# Patient Record
Sex: FEMALE | Race: WHITE | NOT HISPANIC OR LATINO | Employment: OTHER | URBAN - METROPOLITAN AREA
[De-identification: names, ages, dates, MRNs, and addresses within clinical notes are randomized per-mention and may not be internally consistent; named-entity substitution may affect disease eponyms.]

---

## 2021-08-03 ENCOUNTER — APPOINTMENT (OUTPATIENT)
Dept: CT IMAGING | Facility: HOSPITAL | Age: 86
End: 2021-08-03

## 2021-08-03 ENCOUNTER — APPOINTMENT (OUTPATIENT)
Dept: GENERAL RADIOLOGY | Facility: HOSPITAL | Age: 86
End: 2021-08-03

## 2021-08-03 ENCOUNTER — HOSPITAL ENCOUNTER (INPATIENT)
Facility: HOSPITAL | Age: 86
LOS: 2 days | Discharge: HOSPICE | End: 2021-08-06
Attending: EMERGENCY MEDICINE | Admitting: STUDENT IN AN ORGANIZED HEALTH CARE EDUCATION/TRAINING PROGRAM

## 2021-08-03 DIAGNOSIS — I63.9 CEREBROVASCULAR ACCIDENT (CVA), UNSPECIFIED MECHANISM (HCC): Primary | ICD-10-CM

## 2021-08-03 PROBLEM — R41.82 ALTERED MENTAL STATUS: Status: ACTIVE | Noted: 2021-08-03

## 2021-08-03 LAB
ABO GROUP BLD: NORMAL
ALBUMIN SERPL-MCNC: 3.8 G/DL (ref 3.5–5.2)
ALBUMIN/GLOB SERPL: 1 G/DL
ALP SERPL-CCNC: 86 U/L (ref 39–117)
ALT SERPL W P-5'-P-CCNC: 12 U/L (ref 1–33)
ANION GAP SERPL CALCULATED.3IONS-SCNC: 11 MMOL/L (ref 5–15)
AST SERPL-CCNC: 24 U/L (ref 1–32)
BACTERIA UR QL AUTO: ABNORMAL /HPF
BASOPHILS # BLD AUTO: 0.1 10*3/MM3 (ref 0–0.2)
BASOPHILS NFR BLD AUTO: 0.8 % (ref 0–1.5)
BILIRUB SERPL-MCNC: 0.9 MG/DL (ref 0–1.2)
BILIRUB UR QL STRIP: NEGATIVE
BLD GP AB SCN SERPL QL: NEGATIVE
BUN SERPL-MCNC: 14 MG/DL (ref 8–23)
BUN/CREAT SERPL: 13 (ref 7–25)
CALCIUM SPEC-SCNC: 9.3 MG/DL (ref 8.2–9.6)
CHLORIDE SERPL-SCNC: 93 MMOL/L (ref 98–107)
CHOLEST SERPL-MCNC: 346 MG/DL (ref 0–200)
CK SERPL-CCNC: 391 U/L (ref 20–180)
CLARITY UR: CLEAR
CO2 SERPL-SCNC: 28 MMOL/L (ref 22–29)
COLOR UR: YELLOW
CREAT SERPL-MCNC: 1.08 MG/DL (ref 0.57–1)
DEPRECATED RDW RBC AUTO: 52.5 FL (ref 37–54)
EOSINOPHIL # BLD AUTO: 0 10*3/MM3 (ref 0–0.4)
EOSINOPHIL NFR BLD AUTO: 0.2 % (ref 0.3–6.2)
ERYTHROCYTE [DISTWIDTH] IN BLOOD BY AUTOMATED COUNT: 16.5 % (ref 12.3–15.4)
GFR SERPL CREATININE-BSD FRML MDRD: 48 ML/MIN/1.73
GLOBULIN UR ELPH-MCNC: 3.9 GM/DL
GLUCOSE BLDC GLUCOMTR-MCNC: 117 MG/DL (ref 70–105)
GLUCOSE SERPL-MCNC: 111 MG/DL (ref 65–99)
GLUCOSE UR STRIP-MCNC: NEGATIVE MG/DL
HCT VFR BLD AUTO: 46.7 % (ref 34–46.6)
HDLC SERPL-MCNC: 66 MG/DL (ref 40–60)
HGB BLD-MCNC: 15.8 G/DL (ref 12–15.9)
HGB UR QL STRIP.AUTO: ABNORMAL
HOLD SPECIMEN: NORMAL
HOLD SPECIMEN: NORMAL
HYALINE CASTS UR QL AUTO: ABNORMAL /LPF
INR PPP: 0.98 (ref 0.93–1.1)
KETONES UR QL STRIP: NEGATIVE
LDLC SERPL CALC-MCNC: 234 MG/DL (ref 0–100)
LDLC/HDLC SERPL: 3.55 {RATIO}
LEUKOCYTE ESTERASE UR QL STRIP.AUTO: NEGATIVE
LYMPHOCYTES # BLD AUTO: 0.9 10*3/MM3 (ref 0.7–3.1)
LYMPHOCYTES NFR BLD AUTO: 7 % (ref 19.6–45.3)
MCH RBC QN AUTO: 30.4 PG (ref 26.6–33)
MCHC RBC AUTO-ENTMCNC: 33.8 G/DL (ref 31.5–35.7)
MCV RBC AUTO: 89.9 FL (ref 79–97)
MONOCYTES # BLD AUTO: 0.8 10*3/MM3 (ref 0.1–0.9)
MONOCYTES NFR BLD AUTO: 5.8 % (ref 5–12)
NEUTROPHILS NFR BLD AUTO: 11.6 10*3/MM3 (ref 1.7–7)
NEUTROPHILS NFR BLD AUTO: 86.2 % (ref 42.7–76)
NITRITE UR QL STRIP: NEGATIVE
NRBC BLD AUTO-RTO: 0 /100 WBC (ref 0–0.2)
PH UR STRIP.AUTO: 7.5 [PH] (ref 5–8)
PLATELET # BLD AUTO: 335 10*3/MM3 (ref 140–450)
PMV BLD AUTO: 8.3 FL (ref 6–12)
POTASSIUM SERPL-SCNC: 3.7 MMOL/L (ref 3.5–5.2)
PROT SERPL-MCNC: 7.7 G/DL (ref 6–8.5)
PROT UR QL STRIP: ABNORMAL
PROTHROMBIN TIME: 10.9 SECONDS (ref 9.6–11.7)
RBC # BLD AUTO: 5.2 10*6/MM3 (ref 3.77–5.28)
RBC # UR: ABNORMAL /HPF
REF LAB TEST METHOD: ABNORMAL
RH BLD: POSITIVE
SARS-COV-2 RNA PNL SPEC NAA+PROBE: NOT DETECTED
SODIUM SERPL-SCNC: 132 MMOL/L (ref 136–145)
SP GR UR STRIP: 1.01 (ref 1–1.03)
SQUAMOUS #/AREA URNS HPF: ABNORMAL /HPF
T&S EXPIRATION DATE: NORMAL
TRIGL SERPL-MCNC: 228 MG/DL (ref 0–150)
TSH SERPL DL<=0.05 MIU/L-ACNC: 49.79 UIU/ML (ref 0.27–4.2)
UROBILINOGEN UR QL STRIP: ABNORMAL
VLDLC SERPL-MCNC: 46 MG/DL (ref 5–40)
WBC # BLD AUTO: 13.5 10*3/MM3 (ref 3.4–10.8)
WBC UR QL AUTO: ABNORMAL /HPF

## 2021-08-03 PROCEDURE — 86900 BLOOD TYPING SEROLOGIC ABO: CPT

## 2021-08-03 PROCEDURE — 86850 RBC ANTIBODY SCREEN: CPT | Performed by: EMERGENCY MEDICINE

## 2021-08-03 PROCEDURE — 82962 GLUCOSE BLOOD TEST: CPT

## 2021-08-03 PROCEDURE — G0378 HOSPITAL OBSERVATION PER HR: HCPCS

## 2021-08-03 PROCEDURE — 85610 PROTHROMBIN TIME: CPT | Performed by: EMERGENCY MEDICINE

## 2021-08-03 PROCEDURE — U0005 INFEC AGEN DETEC AMPLI PROBE: HCPCS | Performed by: EMERGENCY MEDICINE

## 2021-08-03 PROCEDURE — 93005 ELECTROCARDIOGRAM TRACING: CPT | Performed by: EMERGENCY MEDICINE

## 2021-08-03 PROCEDURE — 99285 EMERGENCY DEPT VISIT HI MDM: CPT

## 2021-08-03 PROCEDURE — 86901 BLOOD TYPING SEROLOGIC RH(D): CPT | Performed by: EMERGENCY MEDICINE

## 2021-08-03 PROCEDURE — 82550 ASSAY OF CK (CPK): CPT | Performed by: EMERGENCY MEDICINE

## 2021-08-03 PROCEDURE — 80053 COMPREHEN METABOLIC PANEL: CPT | Performed by: EMERGENCY MEDICINE

## 2021-08-03 PROCEDURE — 70450 CT HEAD/BRAIN W/O DYE: CPT

## 2021-08-03 PROCEDURE — 86901 BLOOD TYPING SEROLOGIC RH(D): CPT

## 2021-08-03 PROCEDURE — 36415 COLL VENOUS BLD VENIPUNCTURE: CPT | Performed by: EMERGENCY MEDICINE

## 2021-08-03 PROCEDURE — 84443 ASSAY THYROID STIM HORMONE: CPT | Performed by: NURSE PRACTITIONER

## 2021-08-03 PROCEDURE — 73523 X-RAY EXAM HIPS BI 5/> VIEWS: CPT

## 2021-08-03 PROCEDURE — 86900 BLOOD TYPING SEROLOGIC ABO: CPT | Performed by: EMERGENCY MEDICINE

## 2021-08-03 PROCEDURE — 85025 COMPLETE CBC W/AUTO DIFF WBC: CPT | Performed by: EMERGENCY MEDICINE

## 2021-08-03 PROCEDURE — 84481 FREE ASSAY (FT-3): CPT | Performed by: STUDENT IN AN ORGANIZED HEALTH CARE EDUCATION/TRAINING PROGRAM

## 2021-08-03 PROCEDURE — 83036 HEMOGLOBIN GLYCOSYLATED A1C: CPT | Performed by: NURSE PRACTITIONER

## 2021-08-03 PROCEDURE — 81001 URINALYSIS AUTO W/SCOPE: CPT | Performed by: EMERGENCY MEDICINE

## 2021-08-03 PROCEDURE — 71045 X-RAY EXAM CHEST 1 VIEW: CPT

## 2021-08-03 PROCEDURE — 99220 PR INITIAL OBSERVATION CARE/DAY 70 MINUTES: CPT | Performed by: NURSE PRACTITIONER

## 2021-08-03 PROCEDURE — 82607 VITAMIN B-12: CPT | Performed by: NURSE PRACTITIONER

## 2021-08-03 PROCEDURE — 80061 LIPID PANEL: CPT | Performed by: NURSE PRACTITIONER

## 2021-08-03 PROCEDURE — U0003 INFECTIOUS AGENT DETECTION BY NUCLEIC ACID (DNA OR RNA); SEVERE ACUTE RESPIRATORY SYNDROME CORONAVIRUS 2 (SARS-COV-2) (CORONAVIRUS DISEASE [COVID-19]), AMPLIFIED PROBE TECHNIQUE, MAKING USE OF HIGH THROUGHPUT TECHNOLOGIES AS DESCRIBED BY CMS-2020-01-R: HCPCS | Performed by: EMERGENCY MEDICINE

## 2021-08-03 PROCEDURE — 25010000002 DIPHENHYDRAMINE PER 50 MG: Performed by: NURSE PRACTITIONER

## 2021-08-03 PROCEDURE — P9612 CATHETERIZE FOR URINE SPEC: HCPCS

## 2021-08-03 PROCEDURE — 84439 ASSAY OF FREE THYROXINE: CPT | Performed by: STUDENT IN AN ORGANIZED HEALTH CARE EDUCATION/TRAINING PROGRAM

## 2021-08-03 RX ORDER — DONEPEZIL HYDROCHLORIDE 5 MG/1
10 TABLET, FILM COATED ORAL 2 TIMES DAILY
Status: DISCONTINUED | OUTPATIENT
Start: 2021-08-03 | End: 2021-08-06 | Stop reason: HOSPADM

## 2021-08-03 RX ORDER — SODIUM CHLORIDE 0.9 % (FLUSH) 0.9 %
10 SYRINGE (ML) INJECTION AS NEEDED
Status: DISCONTINUED | OUTPATIENT
Start: 2021-08-03 | End: 2021-08-06 | Stop reason: HOSPADM

## 2021-08-03 RX ORDER — BISACODYL 10 MG
10 SUPPOSITORY, RECTAL RECTAL DAILY PRN
Status: DISCONTINUED | OUTPATIENT
Start: 2021-08-03 | End: 2021-08-06 | Stop reason: HOSPADM

## 2021-08-03 RX ORDER — SODIUM CHLORIDE 0.9 % (FLUSH) 0.9 %
3 SYRINGE (ML) INJECTION EVERY 12 HOURS SCHEDULED
Status: DISCONTINUED | OUTPATIENT
Start: 2021-08-03 | End: 2021-08-06 | Stop reason: HOSPADM

## 2021-08-03 RX ORDER — ACETAMINOPHEN 650 MG/1
650 SUPPOSITORY RECTAL EVERY 4 HOURS PRN
Status: DISCONTINUED | OUTPATIENT
Start: 2021-08-03 | End: 2021-08-06 | Stop reason: HOSPADM

## 2021-08-03 RX ORDER — ASPIRIN 300 MG/1
300 SUPPOSITORY RECTAL ONCE
Status: COMPLETED | OUTPATIENT
Start: 2021-08-03 | End: 2021-08-03

## 2021-08-03 RX ORDER — PANTOPRAZOLE SODIUM 40 MG/1
40 TABLET, DELAYED RELEASE ORAL EVERY MORNING
Status: DISCONTINUED | OUTPATIENT
Start: 2021-08-04 | End: 2021-08-04

## 2021-08-03 RX ORDER — SODIUM CHLORIDE 9 MG/ML
75 INJECTION, SOLUTION INTRAVENOUS CONTINUOUS
Status: DISCONTINUED | OUTPATIENT
Start: 2021-08-03 | End: 2021-08-05

## 2021-08-03 RX ORDER — LEVOTHYROXINE SODIUM 0.12 MG/1
125 TABLET ORAL DAILY
Status: DISCONTINUED | OUTPATIENT
Start: 2021-08-03 | End: 2021-08-04

## 2021-08-03 RX ORDER — DIPHENHYDRAMINE HYDROCHLORIDE 50 MG/ML
25 INJECTION INTRAMUSCULAR; INTRAVENOUS ONCE
Status: COMPLETED | OUTPATIENT
Start: 2021-08-03 | End: 2021-08-03

## 2021-08-03 RX ORDER — ONDANSETRON 2 MG/ML
4 INJECTION INTRAMUSCULAR; INTRAVENOUS EVERY 6 HOURS PRN
Status: DISCONTINUED | OUTPATIENT
Start: 2021-08-03 | End: 2021-08-06 | Stop reason: HOSPADM

## 2021-08-03 RX ORDER — DONEPEZIL HYDROCHLORIDE 10 MG/1
10 TABLET, FILM COATED ORAL 2 TIMES DAILY
COMMUNITY

## 2021-08-03 RX ORDER — SODIUM CHLORIDE 0.9 % (FLUSH) 0.9 %
3-10 SYRINGE (ML) INJECTION AS NEEDED
Status: DISCONTINUED | OUTPATIENT
Start: 2021-08-03 | End: 2021-08-06 | Stop reason: HOSPADM

## 2021-08-03 RX ORDER — ASPIRIN 81 MG/1
81 TABLET, CHEWABLE ORAL DAILY
Status: DISCONTINUED | OUTPATIENT
Start: 2021-08-04 | End: 2021-08-04

## 2021-08-03 RX ORDER — LEVOTHYROXINE SODIUM 0.12 MG/1
125 TABLET ORAL DAILY
COMMUNITY

## 2021-08-03 RX ORDER — METOPROLOL SUCCINATE AND HYDROCHLOROTHIAZIDE 25; 12.5 MG/1; MG/1
1 TABLET ORAL 2 TIMES DAILY
COMMUNITY

## 2021-08-03 RX ORDER — ACETAMINOPHEN 500 MG
1000 TABLET ORAL EVERY 6 HOURS PRN
Status: DISCONTINUED | OUTPATIENT
Start: 2021-08-03 | End: 2021-08-06 | Stop reason: HOSPADM

## 2021-08-03 RX ORDER — BISOPROLOL FUMARATE AND HYDROCHLOROTHIAZIDE 10; 6.25 MG/1; MG/1
1 TABLET ORAL
Status: DISCONTINUED | OUTPATIENT
Start: 2021-08-03 | End: 2021-08-03

## 2021-08-03 RX ORDER — ACETAMINOPHEN 325 MG/1
650 TABLET ORAL EVERY 4 HOURS PRN
Status: DISCONTINUED | OUTPATIENT
Start: 2021-08-03 | End: 2021-08-06 | Stop reason: HOSPADM

## 2021-08-03 RX ORDER — ACETAMINOPHEN 500 MG
1000 TABLET ORAL EVERY 6 HOURS PRN
COMMUNITY

## 2021-08-03 RX ORDER — OMEPRAZOLE 40 MG/1
40 CAPSULE, DELAYED RELEASE ORAL DAILY
COMMUNITY

## 2021-08-03 RX ADMIN — LEVOTHYROXINE SODIUM 125 MCG: 0.12 TABLET ORAL at 22:32

## 2021-08-03 RX ADMIN — DIPHENHYDRAMINE HYDROCHLORIDE 25 MG: 50 INJECTION INTRAMUSCULAR; INTRAVENOUS at 22:32

## 2021-08-03 RX ADMIN — Medication 3 ML: at 22:32

## 2021-08-03 RX ADMIN — SODIUM CHLORIDE 75 ML/HR: 9 INJECTION, SOLUTION INTRAVENOUS at 23:16

## 2021-08-03 RX ADMIN — ASPIRIN 300 MG: 300 SUPPOSITORY RECTAL at 16:20

## 2021-08-03 RX ADMIN — METOPROLOL TARTRATE 25 MG: 25 TABLET, FILM COATED ORAL at 22:32

## 2021-08-03 RX ADMIN — DONEPEZIL HYDROCHLORIDE 10 MG: 5 TABLET, FILM COATED ORAL at 22:32

## 2021-08-03 NOTE — CASE MANAGEMENT/SOCIAL WORK
Discharge Planning Assessment  [facility]     Patient Name: Cindy Davis  MRN: [id number]  Today's Date: 8/3/2021    Admit Date: 8/3/2021    Discharge Needs Assessment    No documentation.       Discharge Plan     Row Name 08/03/21 1806       Plan    Plan  DC Plan: Seeking ECF with [facility].    Plan Comments  Spoke with patient, daughter in law and friend. Bigg miguel lives in own home with support of family and [facility]. She has a rx. walker, PCP: Jhonny, [facility]acy: [facility] Now seeking placment and would like to resume [facility] at facility. Notifed Hospice of admission.        Continued Care and Services - Admitted Since 8/3/2021     Destination     Service Provider Request Status Selected Services Address Phone Fax Patient Preferred    [facility]  Pending - Request Sent N/A [address] [place] [address] [phone] --                Demographic Summary     Row Name 08/03/21 1805       General Information    Admission Type  observation    Arrived From  emergency department    Referral Source  emergency department    Reason for Consult  discharge planning    Preferred Language  English        Functional Status     Row Name 08/03/21 1805       Functional Status    Usual Activity Tolerance  poor    Current Activity Tolerance  poor       Functional Status, IADL    Medications  completely dependent    Meal Preparation  completely dependent    Housekeeping  completely dependent        Elen Cabrera RN   Met with patient in room wearing PPE: mask, face shield/goggles, gloves, gown.      Maintained distance greater than six feet and spent less than 15 minutes in the room.    ;p

## 2021-08-03 NOTE — PLAN OF CARE
Problem: Adult Inpatient Plan of Care  Goal: Plan of Care Review  Outcome: Ongoing, Progressing  Flowsheets (Taken 8/3/2021 1847)  Plan of Care Reviewed With: patient  Goal: Patient-Specific Goal (Individualized)  Outcome: Ongoing, Progressing  Goal: Absence of Hospital-Acquired Illness or Injury  Outcome: Ongoing, Progressing  Intervention: Identify and Manage Fall Risk  Recent Flowsheet Documentation  Taken 8/3/2021 1836 by Dahlia Guzman RN  Safety Promotion/Fall Prevention:   activity supervised   clutter free environment maintained   assistive device/personal items within reach   fall prevention program maintained   lighting adjusted   nonskid shoes/slippers when out of bed   room organization consistent   safety round/check completed  Intervention: Prevent Skin Injury  Recent Flowsheet Documentation  Taken 8/3/2021 1836 by Dahlia Guzman RN  Body Position:   turned   supine  Skin Protection:   adhesive use limited   incontinence pads utilized   transparent dressing maintained   tubing/devices free from skin contact  Intervention: Prevent Infection  Recent Flowsheet Documentation  Taken 8/3/2021 1836 by Dahlia Guzman RN  Infection Prevention:   personal protective equipment utilized   hand hygiene promoted  Goal: Optimal Comfort and Wellbeing  Outcome: Ongoing, Progressing  Intervention: Provide Person-Centered Care  Recent Flowsheet Documentation  Taken 8/3/2021 1836 by Dahlia Guzman RN  Trust Relationship/Rapport:   care explained   choices provided   emotional support provided   empathic listening provided   questions encouraged   questions answered   thoughts/feelings acknowledged  Goal: Readiness for Transition of Care  Outcome: Ongoing, Progressing  Intervention: Mutually Develop Transition Plan  Recent Flowsheet Documentation  Taken 8/3/2021 1835 by Dahlia Guzman RN  Transportation Anticipated: family or friend will provide  Patient/Family Anticipated Services at Transition: case  manager  Patient/Family Anticipates Transition to: inpatient rehabilitation facility     Problem: Fall Injury Risk  Goal: Absence of Fall and Fall-Related Injury  Outcome: Ongoing, Progressing  Intervention: Identify and Manage Contributors to Fall Injury Risk  Recent Flowsheet Documentation  Taken 8/3/2021 1836 by Dahlia Guzman, RN  Medication Review/Management:   medications reviewed   high-risk medications identified  Self-Care Promotion: independence encouraged  Intervention: Promote Injury-Free Environment  Recent Flowsheet Documentation  Taken 8/3/2021 1836 by Dahlia Guzman RN  Safety Promotion/Fall Prevention:   activity supervised   clutter free environment maintained   assistive device/personal items within reach   fall prevention program maintained   lighting adjusted   nonskid shoes/slippers when out of bed   room organization consistent   safety round/check completed     Problem: Skin Injury Risk Increased  Goal: Skin Health and Integrity  Outcome: Ongoing, Progressing  Intervention: Optimize Skin Protection  Recent Flowsheet Documentation  Taken 8/3/2021 1836 by Dahlia Guzman, RN  Head of Bed (HOB): HOB elevated  Skin Protection:   adhesive use limited   incontinence pads utilized   transparent dressing maintained   tubing/devices free from skin contact   Goal Outcome Evaluation:  Plan of Care Reviewed With: patient

## 2021-08-03 NOTE — H&P
"    [facility] Medicine Services      Patient Name: Cindy Davis  : 1929  MRN: [id number]  Primary Care Physician:  Provider, No Known  Date of admission: 8/3/2021      Subjective      Chief Complaint: found down at home    History of Present Illness: Cindy Davis is a 89 y.o. female with PMH of CVA, HTN, HLD, previous CVA, and dementia who is at patient of [facility] being treated 3 days a week. She has advanced dementia and has been falling frequently. She will have days where she is lucid and can fully care for herself and then other days where she becomes confused. She was found down at home by the daughter and appeared very confused this morning on 8/3/2021. She was last seen in her normal state of health around dinnertime last night. Hospice came to evaluate the patient and felt the patient needed to go to the ER for evaluation. The family and hospice have been working on getting the patient placed into a facility with 24 hour care. She is a DNR/DNI but not completely comfort care and the family would like further stroke workup.  They denied any recent illness of the patient such as fever, nausea, vomiting.     In the ED the patient is disoriented but will make random statements such as \"I can't get my arm throughout there.\" She cannot answer any questions or follow commands. She moves bilateral upper extremities spontaneously.  CT head showed no acute findings, limited study due to motion artifact, chronic ischemic changes.  CXR showed no acute cardiopulmonary process.  X-ray of bilateral hips showed no acute fracture or traumatic malalignment.  CK total was 391, creatinine 1.08, WBC 13.5.  Urinalysis showed no signs of infection.  Covid not detected. She was given 300mg rectal aspirin. She was admitted for further stroke workup and placement. Discussed case with case management and they will contact the patient's hospice company.       Review of Systems   Unable " to perform ROS: patient nonverbal       Personal History     Past Medical History:   Diagnosis Date   • Alzheimer's dementia (CMS/HCC)    • Breast CA (CMS/HCC)    • HTN (hypertension)    • Hyperlipidemia    • Hypothyroidism        History reviewed. No pertinent surgical history.    Family History: family history is not on file. Otherwise pertinent FHx was reviewed and not pertinent to current issue.    Social History:  reports that she has never smoked. She does not have any smokeless tobacco history on file.    Home Medications:  Prior to Admission Medications     Prescriptions Last Dose Informant Patient Reported? Taking?    donepezil (ARICEPT) 10 MG tablet   Yes Yes    Take 10 mg by mouth Every Night.    Levothyroxine Sodium (LEVOTHROID PO)   Yes Yes    Take  by mouth.            Allergies:  No Known Allergies    Objective      Vitals:   Temp:  [97.6 °F (36.4 °C)-97.8 °F (36.6 °C)] 97.6 °F (36.4 °C)  Heart Rate:  [60-74] 60  Resp:  [16-18] 18  BP: (140-183)/(54-99) 140/54    Physical Exam  Vitals and nursing note reviewed.   HENT:      Head: Normocephalic and atraumatic.   Eyes:      Pupils: Pupils are equal, round, and reactive to light.   Cardiovascular:      Rate and Rhythm: Normal rate and regular rhythm.      Pulses: Normal pulses.      Heart sounds: Normal heart sounds.   Pulmonary:      Effort: Pulmonary effort is normal.      Breath sounds: Normal breath sounds.   Abdominal:      General: Bowel sounds are normal.      Palpations: Abdomen is soft.      Tenderness: There is no abdominal tenderness.   Skin:     General: Skin is warm and dry.   Neurological:      Mental Status: She is confused.      Comments: Makes random statements, cannot follow commands or answer questions. Moves bilateral upper extremities    Psychiatric:         Behavior: Behavior is agitated.         Cognition and Memory: Cognition is impaired.         Result Review    Result Review:  I have personally reviewed the results from the time  "of this admission to 8/3/2021 18:56 EDT and agree with these findings:  [x]  Laboratory  []  Microbiology  [x]  Radiology  []  EKG/Telemetry   []  Cardiology/Vascular   []  Pathology  [x]  Old records  []  Other:      Assessment/Plan        Active Hospital Problems:  Active Hospital Problems    Diagnosis    • **Altered mental status    • Fall    • Alzheimer's dementia (CMS/HCC)    • HTN (hypertension)    • Hypothyroidism    • History of CVA (cerebrovascular accident)      Plan:     Altered mental status r/o CVA  Fall   -pt found down and confused morning of 8/3/21 and now remains confused and poorly communicative, no obvious lateralizing deficits  -CT head: no acute findings, limited study due to motion artifact, chronic ischemic changes.   -WBC elevated 13.5  -UA without signs of infection  -CK total 391, low rate IVFs   -given 300mg rectal aspirin in ED, will not give statin due to elevated CK level  -Family wants stroke workup with MRI brain, 2D echo, TSH/lipid panel/hgb a1c/vit b12, PT/OT/ST  -neurology consulted for further recommendations  -DNR/DNI, hospice patient and will consult. Pt will need placement to facility, case management aware.   -pt agitated and family requesting medication to \"calm her down.\" Will try benadryl and if no improvement, will add prn ativan     Dementia  -cont home aricept    Hypothyroidism  -cont home synthroid    H/o CVA    Hypertension  -cont home bb      DVT prophylaxis:  Mechanical DVT prophylaxis orders are present.    CODE STATUS:    Code Status: No CPR  Medical Interventions (Level of Support Prior to Arrest): Comfort Measures    Admission Status:  I believe this patient meets observation status.    I discussed the patient's findings and my recommendations with family.    This patient has been examined wearing appropriate Personal Protective Equipment 08/03/21      Signature: Electronically signed by FELICIANO Paul, 08/03/21, 6:58 PM EDT.  "

## 2021-08-03 NOTE — ED PROVIDER NOTES
Subjective   History of Present Illness  89-year-old female presents after being found on floor today.  Her last known normal was last night around dinnertime.  She does not offer meaningful complaints at this time.  She was noted to be confused.  Family reports that she normally can talk coherently.  She does have dementia that has been getting worse though.  She has had more falls recently.  Review of Systems  Unable to obtain due to current condition  Past Medical History:   Diagnosis Date   • Alzheimer's dementia (CMS/[facility])    • Breast CA (CMS/[facility])    • HTN (hypertension)    • Hyperlipidemia    • Hyperthyroidism        No Known Allergies    History reviewed. No pertinent surgical history.    History reviewed. No pertinent family history.    Social History     Socioeconomic History   • Marital status:      Spouse name: Not on file   • Number of children: Not on file   • Years of education: Not on file   • Highest education level: Not on file     Prior to Admission medications    Medication Sig Start Date End Date Taking? Authorizing Provider   donepezil (ARICEPT) 10 MG tablet Take 10 mg by mouth Every Night.   Yes ProviderBob MD   Levothyroxine Sodium (LEVOTHROID PO) Take  by mouth.   Yes Provider, MD Bob           Objective   Physical Exam  89-year-old female awake alert.  She is noted to have confused speech.  Pupils equal round at light.  Examination of head reveals no obvious injury.  Chest clear equal breath sounds.  Cardiovascular regular rhythm abdomen soft nontender.  She does complain of some pain with movement of both hips.  Neurologic exam she is confused.  She is able to raise both arms.  She blinks with confrontation bilaterally.  NIH stroke scale 11  Procedures           ED Course      Results for orders placed or performed during the hospital encounter of 08/03/21   Comprehensive Metabolic Panel    Specimen: Blood   Result Value Ref Range    Glucose 111 (H) 65 - 99 mg/dL     BUN 14 8 - 23 mg/dL    Creatinine 1.08 (H) 0.57 - 1.00 mg/dL    Sodium 132 (L) 136 - 145 mmol/L    Potassium 3.7 3.5 - 5.2 mmol/L    Chloride 93 (L) 98 - 107 mmol/L    CO2 28.0 22.0 - 29.0 mmol/L    Calcium 9.3 8.2 - 9.6 mg/dL    Total Protein 7.7 6.0 - 8.5 g/dL    Albumin 3.80 3.50 - 5.20 g/dL    ALT (SGPT) 12 1 - 33 U/L    AST (SGOT) 24 1 - 32 U/L    Alkaline Phosphatase 86 39 - 117 U/L    Total Bilirubin 0.9 0.0 - 1.2 mg/dL    eGFR Non African Amer 48 (L) >60 mL/min/1.73    Globulin 3.9 gm/dL    A/G Ratio 1.0 g/dL    BUN/Creatinine Ratio 13.0 7.0 - 25.0    Anion Gap 11.0 5.0 - 15.0 mmol/L   Protime-INR    Specimen: Blood   Result Value Ref Range    Protime 10.9 9.6 - 11.7 Seconds    INR 0.98 0.93 - 1.10   CBC Auto Differential    Specimen: Blood   Result Value Ref Range    WBC 13.50 (H) 3.40 - 10.80 10*3/mm3    RBC 5.20 3.77 - 5.28 10*6/mm3    Hemoglobin 15.8 12.0 - 15.9 g/dL    Hematocrit 46.7 (H) 34.0 - 46.6 %    MCV 89.9 79.0 - 97.0 fL    MCH 30.4 26.6 - 33.0 pg    MCHC 33.8 31.5 - 35.7 g/dL    RDW 16.5 (H) 12.3 - 15.4 %    RDW-SD 52.5 37.0 - 54.0 fl    MPV 8.3 6.0 - 12.0 fL    Platelets 335 140 - 450 10*3/mm3    Neutrophil % 86.2 (H) 42.7 - 76.0 %    Lymphocyte % 7.0 (L) 19.6 - 45.3 %    Monocyte % 5.8 5.0 - 12.0 %    Eosinophil % 0.2 (L) 0.3 - 6.2 %    Basophil % 0.8 0.0 - 1.5 %    Neutrophils, Absolute 11.60 (H) 1.70 - 7.00 10*3/mm3    Lymphocytes, Absolute 0.90 0.70 - 3.10 10*3/mm3    Monocytes, Absolute 0.80 0.10 - 0.90 10*3/mm3    Eosinophils, Absolute 0.00 0.00 - 0.40 10*3/mm3    Basophils, Absolute 0.10 0.00 - 0.20 10*3/mm3    nRBC 0.0 0.0 - 0.2 /100 WBC   Urinalysis With Microscopic If Indicated (No Culture) - Urine, Catheter    Specimen: Urine, Catheter   Result Value Ref Range    Color, UA Yellow Yellow, Straw    Appearance, UA Clear Clear    pH, UA 7.5 5.0 - 8.0    Specific Gravity, UA 1.014 1.005 - 1.030    Glucose, UA Negative Negative    Ketones, UA Negative Negative    Bilirubin, UA Negative  Negative    Blood, UA Trace (A) Negative    Protein, UA >=300 mg/dL (3+) (A) Negative    Leuk Esterase, UA Negative Negative    Nitrite, UA Negative Negative    Urobilinogen, UA 0.2 E.U./dL 0.2 - 1.0 E.U./dL   CK    Specimen: Blood   Result Value Ref Range    Creatine Kinase 391 (H) 20 - 180 U/L   Urinalysis, Microscopic Only - Urine, Catheter    Specimen: Urine, Catheter   Result Value Ref Range    RBC, UA 0-2 (A) None Seen /HPF    WBC, UA 0-2 (A) None Seen /HPF    Bacteria, UA None Seen None Seen /HPF    Squamous Epithelial Cells, UA 0-2 None Seen, 0-2 /HPF    Hyaline Casts, UA 0-2 None Seen /LPF    Methodology Automated Microscopy    POC Glucose Once    Specimen: Blood   Result Value Ref Range    Glucose 117 (H) 70 - 105 mg/dL   ECG 12 Lead   Result Value Ref Range    QT Interval 676 ms   Type & Screen    Specimen: Blood   Result Value Ref Range    ABO Type A     RH type Positive     Antibody Screen Negative     T&S Expiration Date 8/6/2021 11:59:59 PM    Green Top (Gel)   Result Value Ref Range    Extra Tube HOLD    Gold Top - SST   Result Value Ref Range    Extra Tube Hold for add-ons.      XR Chest 1 View    Result Date: 8/3/2021  No acute cardiopulmonary process identified.  Electronically Signed By-Lincoln Keller MD On:8/3/2021 1:50 PM This report was finalized on [id number] by  Lincoln Keller MD.    CT Head Without Contrast Stroke Protocol    Result Date: 8/3/2021  IMPRESSION :  1. This study is limited by motion artifact. 2. No acute findings. 3. Chronic ischemic changes. 4. Volume loss secondary to age-appropriate atrophy.  Electronically Signed By-Calvin Oro MD On:8/3/2021 2:39 PM This report was finalized on [id number] by  Calvin Oro MD.    XR Hips Bilateral With or Without Pelvis 5 View    Result Date: 8/3/2021  IMPRESSION : No acute fracture or traumatic malalignment identified.  Electronically Signed By-Lincoln Keller MD On:8/3/2021 1:51 PM This report was finalized on [id number]  "by  Lincoln Keller MD.    Medications   sodium chloride 0.9 % flush 10 mL (has no administration in time range)   aspirin suppository 300 mg (has no administration in time range)     /82   Pulse 60   Temp 97.8 °F (36.6 °C)   Resp 18   Ht 165.1 cm (65\")   Wt 95.3 kg (210 lb)   SpO2 98%   BMI 34.95 kg/m²                                        MDM  Chart review: Patient had admission in 2017 for near syncope  Comorbidity: As per past history  Differential: Head injury, stroke, UTI, rhabdomyolysis  My EKG interpretation: Sinus rhythm first-degree AV block nonspecific ST-T wave abnormality prolonged QT interval or U wave.  Compared to previous QT prolongation nonspecific repolarization abnormality now present  Lab: Urinalysis 0-2 red cells 0-2 white cells, CK mildly elevated at 391 INR 0.98 comprehensive metabolic panel creatinine 1.08 with sodium of 132 potassium 3.7 CBC with a white count of 13.5 with hemoglobin 15.8 platelet count of 335 with 86 segs no bands.  Radiology:   I reviewed all x-rays.  Chest x-ray no acute cardiopulmonary abnormality noted.  CT scan of head shows chronic ischemic changes volume loss.  There is encephalomalacia in the posterior left temporal lobe which is unchanged consistent with old infarct.  There are atherosclerotic vascular calcifications in the distal vertebral arteries and carotid siphons..  Pelvis bilateral hip reveal evidence of previous right hip prosthesis with moderate degenerative change without evidence of acute fracture.  Discussion/treatment: Patient's findings were discussed with family.  She was given rectal aspirin.  Her symptoms are consistent with stroke.  She is not a candidate for aggressive treatment based on last normal of last night.  Patient is already involved with hospice.  She has DNR status which was confirmed by family.  She was discussed with the nurse practitioner the hospitalist.  She will be admitted for further care.  At this point it " appears that rehab prevention of complications would be her primary treatment modality.  Patient was evaluated using appropriate PPE      Final diagnoses:   Cerebrovascular accident (CVA), unspecified mechanism (CMS/HCC)       ED Disposition  ED Disposition     ED Disposition Condition Comment    Decision to Admit            No follow-up provider specified.       Medication List      No changes were made to your prescriptions during this visit.          Leroy Palomares MD  08/03/21 9750

## 2021-08-04 ENCOUNTER — APPOINTMENT (OUTPATIENT)
Dept: CARDIOLOGY | Facility: HOSPITAL | Age: 86
End: 2021-08-04

## 2021-08-04 LAB
ANION GAP SERPL CALCULATED.3IONS-SCNC: 11 MMOL/L (ref 5–15)
BUN SERPL-MCNC: 21 MG/DL (ref 8–23)
BUN/CREAT SERPL: 16.4 (ref 7–25)
CALCIUM SPEC-SCNC: 9.2 MG/DL (ref 8.2–9.6)
CHLORIDE SERPL-SCNC: 95 MMOL/L (ref 98–107)
CO2 SERPL-SCNC: 28 MMOL/L (ref 22–29)
CREAT SERPL-MCNC: 1.28 MG/DL (ref 0.57–1)
DEPRECATED RDW RBC AUTO: 52.5 FL (ref 37–54)
ERYTHROCYTE [DISTWIDTH] IN BLOOD BY AUTOMATED COUNT: 16.7 % (ref 12.3–15.4)
FOLATE SERPL-MCNC: 12.2 NG/ML (ref 4.78–24.2)
GFR SERPL CREATININE-BSD FRML MDRD: 39 ML/MIN/1.73
GLUCOSE BLDC GLUCOMTR-MCNC: 100 MG/DL (ref 70–105)
GLUCOSE BLDC GLUCOMTR-MCNC: 108 MG/DL (ref 70–105)
GLUCOSE BLDC GLUCOMTR-MCNC: 117 MG/DL (ref 70–105)
GLUCOSE BLDC GLUCOMTR-MCNC: 99 MG/DL (ref 70–105)
GLUCOSE SERPL-MCNC: 108 MG/DL (ref 65–99)
HBA1C MFR BLD: 5.4 % (ref 3.5–5.6)
HCT VFR BLD AUTO: 45 % (ref 34–46.6)
HGB BLD-MCNC: 15 G/DL (ref 12–15.9)
MCH RBC QN AUTO: 30.7 PG (ref 26.6–33)
MCHC RBC AUTO-ENTMCNC: 33.4 G/DL (ref 31.5–35.7)
MCV RBC AUTO: 91.8 FL (ref 79–97)
PLATELET # BLD AUTO: 348 10*3/MM3 (ref 140–450)
PMV BLD AUTO: 8.6 FL (ref 6–12)
POTASSIUM SERPL-SCNC: 3.9 MMOL/L (ref 3.5–5.2)
RBC # BLD AUTO: 4.9 10*6/MM3 (ref 3.77–5.28)
SODIUM SERPL-SCNC: 134 MMOL/L (ref 136–145)
T3FREE SERPL-MCNC: 1.47 PG/ML (ref 2–4.4)
T4 FREE SERPL-MCNC: 0.78 NG/DL (ref 0.93–1.7)
VIT B12 BLD-MCNC: 751 PG/ML (ref 211–946)
WBC # BLD AUTO: 10.9 10*3/MM3 (ref 3.4–10.8)

## 2021-08-04 PROCEDURE — 85027 COMPLETE CBC AUTOMATED: CPT | Performed by: STUDENT IN AN ORGANIZED HEALTH CARE EDUCATION/TRAINING PROGRAM

## 2021-08-04 PROCEDURE — 99232 SBSQ HOSP IP/OBS MODERATE 35: CPT | Performed by: STUDENT IN AN ORGANIZED HEALTH CARE EDUCATION/TRAINING PROGRAM

## 2021-08-04 PROCEDURE — 80048 BASIC METABOLIC PNL TOTAL CA: CPT | Performed by: STUDENT IN AN ORGANIZED HEALTH CARE EDUCATION/TRAINING PROGRAM

## 2021-08-04 PROCEDURE — 25010000002 SULFUR HEXAFLUORIDE MICROSPH 60.7-25 MG RECONSTITUTED SUSPENSION: Performed by: STUDENT IN AN ORGANIZED HEALTH CARE EDUCATION/TRAINING PROGRAM

## 2021-08-04 PROCEDURE — 97165 OT EVAL LOW COMPLEX 30 MIN: CPT

## 2021-08-04 PROCEDURE — 99222 1ST HOSP IP/OBS MODERATE 55: CPT | Performed by: PSYCHIATRY & NEUROLOGY

## 2021-08-04 PROCEDURE — 93306 TTE W/DOPPLER COMPLETE: CPT

## 2021-08-04 PROCEDURE — 82962 GLUCOSE BLOOD TEST: CPT

## 2021-08-04 PROCEDURE — 82746 ASSAY OF FOLIC ACID SERUM: CPT | Performed by: PSYCHIATRY & NEUROLOGY

## 2021-08-04 PROCEDURE — 93306 TTE W/DOPPLER COMPLETE: CPT | Performed by: INTERNAL MEDICINE

## 2021-08-04 PROCEDURE — 97161 PT EVAL LOW COMPLEX 20 MIN: CPT

## 2021-08-04 PROCEDURE — 92610 EVALUATE SWALLOWING FUNCTION: CPT

## 2021-08-04 RX ORDER — PANTOPRAZOLE SODIUM 40 MG/10ML
40 INJECTION, POWDER, LYOPHILIZED, FOR SOLUTION INTRAVENOUS
Status: DISCONTINUED | OUTPATIENT
Start: 2021-08-05 | End: 2021-08-06 | Stop reason: HOSPADM

## 2021-08-04 RX ORDER — LABETALOL HYDROCHLORIDE 5 MG/ML
10 INJECTION, SOLUTION INTRAVENOUS
Status: DISCONTINUED | OUTPATIENT
Start: 2021-08-04 | End: 2021-08-06 | Stop reason: HOSPADM

## 2021-08-04 RX ORDER — ATORVASTATIN CALCIUM 40 MG/1
80 TABLET, FILM COATED ORAL NIGHTLY
Status: DISCONTINUED | OUTPATIENT
Start: 2021-08-04 | End: 2021-08-06 | Stop reason: HOSPADM

## 2021-08-04 RX ORDER — LEVOTHYROXINE SODIUM 20 UG/ML
100 INJECTION, SOLUTION INTRAVENOUS
Status: DISCONTINUED | OUTPATIENT
Start: 2021-08-04 | End: 2021-08-06 | Stop reason: HOSPADM

## 2021-08-04 RX ORDER — ASPIRIN 300 MG/1
300 SUPPOSITORY RECTAL DAILY
Status: DISCONTINUED | OUTPATIENT
Start: 2021-08-04 | End: 2021-08-06 | Stop reason: HOSPADM

## 2021-08-04 RX ADMIN — Medication 3 ML: at 10:40

## 2021-08-04 RX ADMIN — Medication 3 ML: at 20:02

## 2021-08-04 RX ADMIN — SULFUR HEXAFLUORIDE 5 ML: KIT at 15:45

## 2021-08-04 RX ADMIN — ASPIRIN 300 MG: 300 SUPPOSITORY RECTAL at 17:29

## 2021-08-04 RX ADMIN — LEVOTHYROXINE SODIUM 100 MCG: 20 INJECTION, SOLUTION INTRAVENOUS at 17:29

## 2021-08-04 NOTE — CASE MANAGEMENT/SOCIAL WORK
Continued Stay Note   [facility]     Patient Name: Cindy Davis  MRN: [id number]  Today's Date: 8/4/2021    Admit Date: 8/3/2021    Discharge Plan     Row Name 08/04/21 1651       Plan    Plan Comments  Given [facility] agreement [facility] pt and anticipation that pt will have short stay at ECF, [facility] liaison stated that typically the expectation is for pt/family to private pay for first 30 days in advance - however, this pt would only have to pay first 7 days initially and then pay the next 30 days if pt is not accepted to [facility]. GIOVANA provided this information to pt's family via secure chat and inquired what they would like to do. Pt's dtr stated that she needs to speak with her  regarding placement. SW advised that she will f/u morning of 8/5. GIOVANA updated [facility] and [facility] liaisons.    Row Name 08/04/21 1631       Plan    Plan  DC Plan: ECF placement with [facility]. [facility] can accept if family agreeable - family discussing and will provide answer morning of 8/5. [facility] is approved.    Plan Comments  GIOVANA called [facility] to inquire about ETA for bedside evaluation and was told that [facility] likely cannot complete evaluation until Monday afternoon. GIOVANA inquired if earlier evaluation could be completed as pt cannot stay in hospital for that length of time and [facility] stated that director could likely see patient Monday morning. GIOVANA received phone call from MD that pt likely is not a candidate for Assisted Living at this time and asked SW if family would pursue ECF placement instead. GIOVANA updated [facility] liaison (Luciana), who stated that Gita with [facility] is trying to see if  would be available to assess today 8/4. GIOVANA informed [facility] liaison that SW is going to discuss at least temporary ECF placement with family in the interim. GIOVANA met with pt's dtr at the bedside to discuss ECF placement. Pt's dtr  informed SW of LTC policy through [employer] and stated that the preference is for ECF to work directly with the LTC insurance without family having to pay privately and wait for reimbursement from LTC insurance policy. SW stated she would try for this but was unsure of how LTC insurance billing worked for ECFs. SW received a text from [facility] liaison (Luciana) that [facility] is willing to accept pt at least temporarily while pt awaits evaluation by [facility]. SW discussed this text with pt's family and family instead expressed interest in [facility] and [facility]. SW called [facility] to inquire if either of the above buildings would be able to be explored. Per [facility] liaison, neither of these buildings ([facility] or [facility]) are working with [facility] at this time. [facility] liaison advised that near pt's home, [facility] has a contract with [facility] as well as most [facility] ECFs. SW called [facility] and [facility] liaison to inquire about LTC insurance billing and both liaison stated that standard billing practice for LTC insurance is for pt/family to private pay with LTC insurance policy to reimburse pt/family.        Met with patient in room wearing PPE: mask, face shield/goggles.      Maintained distance greater than six feet and spent less than 15 minutes in the room.      [facility]    Office: [phone]  Cell: [phone]  Fax: [phone]  E-mail: [email or web address]

## 2021-08-04 NOTE — DISCHARGE PLACEMENT REQUEST
"**Attn: Gita    Please see below face sheet and H&P for Ms. Davis. Please contact below  for additional information as needed.    Lynsey Farris, MSSW, LSW    Office: [phone]  Cell: [phone]  Fax: [phone]  E-mail: [email or web address]**    Bonifacio Davis (89 y.o. Female)     Date of Birth Social Security Number Address Home Phone MRN    [id number]  [id number] [place] [address] [phone] [id number]    Confucianist Marital Status          None        Admission Date Admission Type Admitting Provider Attending Provider Department, Room/Bed    8/3/21 Emergency Dariela Paiz DO [place], [place], DO [facility] NEURO HEART, 270/    Discharge Date Discharge Disposition Discharge Destination                       Attending Provider: Dariela Paiz DO    Allergies: No Known Allergies    Isolation: None   Infection: None   Code Status: No CPR    Ht: 165.1 cm (65\")   Wt: 82.2 kg (181 lb 3.5 oz)    Admission Cmt: None   Principal Problem: Altered mental status [R41.82]                 Active Insurance as of 8/3/2021     Primary Coverage     Payor Plan Insurance Group Employer/Plan Group    MEDICARE MEDICARE A & B      Payor Plan Address Payor Plan Phone Number Payor Plan Fax Number Effective Dates    [address] [phone]  1994 - None Entered    [place] [address]       Subscriber Name Subscriber Birth Date Member ID       BONIFACIO DAVIS 1929 [id number]                 Emergency Contacts      (Rel.) Home Phone Work Phone Mobile Phone    LEE DAVIS (Daughter) [phone] -- --    JOSHUA SHANNON (Friend) [phone] -- --               History & Physical      January Hernandez APRN at [address]              [facility]      Patient Name: Bonifacio Davis  : 1929  MRN: [id number]  Primary Care Physician:  Provider, No Known  Date of admission: " "8/3/2021      Subjective      Chief Complaint: found down at home    History of Present Illness: Cindy Davis is a 89 y.o. female with PMH of CVA, HTN, HLD, previous CVA, and dementia who is at patient of [facility] being treated 3 days a week. She has advanced dementia and has been falling frequently. She will have days where she is lucid and can fully care for herself and then other days where she becomes confused. She was found down at home by the daughter and appeared very confused this morning on 8/3/2021. She was last seen in her normal state of health around dinnertime last night. Hospice came to evaluate the patient and felt the patient needed to go to the ER for evaluation. The family and hospice have been working on getting the patient placed into a facility with 24 hour care. She is a DNR/DNI but not completely comfort care and the family would like further stroke workup.  They denied any recent illness of the patient such as fever, nausea, vomiting.     In the ED the patient is disoriented but will make random statements such as \"I can't get my arm throughout there.\" She cannot answer any questions or follow commands. She moves bilateral upper extremities spontaneously.  CT head showed no acute findings, limited study due to motion artifact, chronic ischemic changes.  CXR showed no acute cardiopulmonary process.  X-ray of bilateral hips showed no acute fracture or traumatic malalignment.  CK total was 391, creatinine 1.08, WBC 13.5.  Urinalysis showed no signs of infection.  Covid not detected. She was given 300mg rectal aspirin. She was admitted for further stroke workup and placement. Discussed case with case management and they will contact the patient's hospice company.       Review of Systems   Unable to perform ROS: patient nonverbal       Personal History     Past Medical History:   Diagnosis Date   • Alzheimer's dementia (CMS/HCC)    • Breast CA (CMS/HCC)    • HTN (hypertension)  "   • Hyperlipidemia    • Hypothyroidism        History reviewed. No pertinent surgical history.    Family History: family history is not on file. Otherwise pertinent FHx was reviewed and not pertinent to current issue.    Social History:  reports that she has never smoked. She does not have any smokeless tobacco history on file.    Home Medications:  Prior to Admission Medications     Prescriptions Last Dose Informant Patient Reported? Taking?    donepezil (ARICEPT) 10 MG tablet   Yes Yes    Take 10 mg by mouth Every Night.    Levothyroxine Sodium (LEVOTHROID PO)   Yes Yes    Take  by mouth.            Allergies:  No Known Allergies    Objective      Vitals:   Temp:  [97.6 °F (36.4 °C)-97.8 °F (36.6 °C)] 97.6 °F (36.4 °C)  Heart Rate:  [60-74] 60  Resp:  [16-18] 18  BP: (140-183)/(54-99) 140/54    Physical Exam  Vitals and nursing note reviewed.   HENT:      Head: Normocephalic and atraumatic.   Eyes:      Pupils: Pupils are equal, round, and reactive to light.   Cardiovascular:      Rate and Rhythm: Normal rate and regular rhythm.      Pulses: Normal pulses.      Heart sounds: Normal heart sounds.   Pulmonary:      Effort: Pulmonary effort is normal.      Breath sounds: Normal breath sounds.   Abdominal:      General: Bowel sounds are normal.      Palpations: Abdomen is soft.      Tenderness: There is no abdominal tenderness.   Skin:     General: Skin is warm and dry.   Neurological:      Mental Status: She is confused.      Comments: Makes random statements, cannot follow commands or answer questions. Moves bilateral upper extremities    Psychiatric:         Behavior: Behavior is agitated.         Cognition and Memory: Cognition is impaired.         Result Review    Result Review:  I have personally reviewed the results from the time of this admission to 8/3/2021 18:56 EDT and agree with these findings:  [x]  Laboratory  []  Microbiology  [x]  Radiology  []  EKG/Telemetry   []  Cardiology/Vascular   []   "Pathology  [x]  Old records  []  Other:      Assessment/Plan        Active Hospital Problems:  Active Hospital Problems    Diagnosis    • **Altered mental status    • Fall    • Alzheimer's dementia (CMS/[facility])    • HTN (hypertension)    • Hypothyroidism    • History of CVA (cerebrovascular accident)      Plan:     Altered mental status r/o CVA  Fall   -pt found down and confused morning of 8/3/21 and now remains confused and poorly communicative, no obvious lateralizing deficits  -CT head: no acute findings, limited study due to motion artifact, chronic ischemic changes.   -WBC elevated 13.5  -UA without signs of infection  -CK total 391, low rate IVFs   -given 300mg rectal aspirin in ED, will not give statin due to elevated CK level  -Family wants stroke workup with MRI brain, 2D echo, TSH/lipid panel/hgb a1c/vit b12, PT/OT/ST  -neurology consulted for further recommendations  -DNR/DNI, hospice patient and will consult. Pt will need placement to facility, case management aware.   -pt agitated and family requesting medication to \"calm her down.\" Will try benadryl and if no improvement, will add prn ativan     Dementia  -cont home aricept    Hypothyroidism  -cont home synthroid    H/o CVA    Hypertension  -cont home bb      DVT prophylaxis:  Mechanical DVT prophylaxis orders are present.    CODE STATUS:    Code Status: No CPR  Medical Interventions (Level of Support Prior to Arrest): Comfort Measures    Admission Status:  I believe this patient meets observation status.    I discussed the patient's findings and my recommendations with family.    This patient has been examined wearing appropriate Personal Protective Equipment 08/03/21      Signature: Electronically signed by FELICIANO Paul, 08/03/21, 6:58 PM EDT.    Electronically signed by January Hernandez APRN at 08/03/21 6037       {Outbreak/Travel/Exposure Documentation......;  Question Available Choices Patient Response   COVID-19 Outbreak Screen:  " Do you currently have a new onset of the following symptoms?        Fever/Chills, Cough, Shortness of air, Loss of taste or smell, No, Unknown  No (08/03/21 1254)   COVID-19 Outbreak Screen: In the last 14 days, have you had contact with anyone who is ill, has show any of the symptoms listed above and/or has been diagnosis with the 2019 Novel Coronavirus? This includes any immediate household members but excludes any patients with whom you have been in contact within your normal work duties wearing proper PPE, if you are a healthcare worker.  Yes, No, Unknown              No (08/03/21 1254)   COVID-19 Outbreak Screen: Who was notified? Free text (not recorded)   Ebola Screening Outbreak Screen: Have you traveled to the [place] or [place] within the past 21 days?  Yes, No, Unknown (not recorded)   Ebola Screening Outbreak Screen: Do you have ANY of the following symptoms: Fever/Chills, Vomiting, Diarrhea, Fatigue, Headache, Muscle pain, Unexplained bleeding, Abdominal (stomach) pain, No, Unknown (not recorded)   Ebola Screening Outbreak Screen: Name of Person notified Free text (not recorded)   Travel Screen: Have you traveled in the last month? If so, to what country have you traveled? If [place] what state? Yes, No, Unknown  List of all countries  List of all States No (08/03/21 1254)  (not recorded)  (not recorded)   Infection Risk: Do you currently have the following symptoms?  (If cough is selected, the Tuberculosis Screen is performed.) Cough, Fever, Rash, No No (08/03/21 1254)   Tuberculosis Screen: Do you have any of the following Tuberculosis Risks?  · Have you lived or spent time with anyone who had or may have TB?  · Have you lived in or visited any of the following areas for more than one month: [place], [place], [place], [place] or [place], the [place] or [place]?  · Do you have HIV/AIDS?  · Have you lived in or worked in a nursing home, homeless shelter, correctional  facility, or substance abuse treatment facility?   · No    If Yes do you have any of the following symptoms? Yes responses display to the right    If Yes, symptoms listed are:  Cough greater than or equal to 3 weeks, Loss of appetite, Unexplained weight loss, Night sweats, Bloody sputum or hemoptysis, Hoarseness, Fever, Fatigue, Chest pain, No (not recorded)  (not recorded)   Exposure Screen: Have you been exposed to any of these contagious diseases in the last month? Measles, Chickenpox, Meningitis, Pertussis, Whooping Cough, No No (08/03/21 1254)         Current Facility-Administered Medications   Medication Dose Route Frequency Provider Last Rate Last Admin   • acetaminophen (TYLENOL) tablet 650 mg  650 mg Oral Q4H PRN January Hernandez APRN        Or   • acetaminophen (TYLENOL) suppository 650 mg  650 mg Rectal Q4H PRN January Hernandez APRN       • acetaminophen (TYLENOL) tablet 1,000 mg  1,000 mg Oral Q6H PRN January Hernandez APRN       • aspirin chewable tablet 81 mg  81 mg Oral Daily January Hernandez, APRN       • atorvastatin (LIPITOR) tablet 80 mg  80 mg Oral Nightly Dariela Paiz,        • bisacodyl (DULCOLAX) suppository 10 mg  10 mg Rectal Daily PRN January Hernandez APRN       • donepezil (ARICEPT) tablet 10 mg  10 mg Oral BID January Hernandez APRN   10 mg at 08/03/21 2232   • levothyroxine (SYNTHROID, LEVOTHROID) tablet 125 mcg  125 mcg Oral Daily January Hernandez APRN   125 mcg at 08/03/21 2232   • metoprolol tartrate (LOPRESSOR) tablet 25 mg  25 mg Oral Q12H January Hernandez, APRN   25 mg at 08/03/21 2232   • ondansetron (ZOFRAN) injection 4 mg  4 mg Intravenous Q6H PRN January Hernandez, APRN       • pantoprazole (PROTONIX) EC tablet 40 mg  40 mg Oral QAM January Hernandez, APRN       • sodium chloride 0.9 % flush 10 mL  10 mL Intravenous PRN Leroy Palomares MD       • sodium chloride 0.9 % flush 3 mL  3 mL Intravenous Q12H January Hernandez, APRN    3 mL at 08/03/21 2232   • sodium chloride 0.9 % flush 3-10 mL  3-10 mL Intravenous PRN January Hernandez APRN       • sodium chloride 0.9 % infusion  75 mL/hr Intravenous Continuous January Hernandez APRN 75 mL/hr at 08/03/21 2316 75 mL/hr at 08/03/21 2316     Physician Progress Notes (most recent note)    No notes of this type exist for this encounter.         Consult Notes (most recent note)    No notes of this type exist for this encounter.

## 2021-08-04 NOTE — PLAN OF CARE
Goal Outcome Evaluation:  Plan of Care Reviewed With: patient        Problem: Adult Inpatient Plan of Care  Goal: Plan of Care Review  Outcome: Ongoing, Progressing  Flowsheets (Taken 8/4/2021 1246 by Angeles Murphy OT)  Plan of Care Reviewed With: patient  Goal: Patient-Specific Goal (Individualized)  Outcome: Ongoing, Progressing  Goal: Absence of Hospital-Acquired Illness or Injury  Outcome: Ongoing, Progressing  Intervention: Identify and Manage Fall Risk  Recent Flowsheet Documentation  Taken 8/4/2021 1400 by Dahlia Guzman RN  Safety Promotion/Fall Prevention:   activity supervised   assistive device/personal items within reach   clutter free environment maintained   fall prevention program maintained   lighting adjusted   nonskid shoes/slippers when out of bed   room organization consistent   safety round/check completed  Taken 8/4/2021 1200 by Dahlia Guzman RN  Safety Promotion/Fall Prevention:   assistive device/personal items within reach   clutter free environment maintained   lighting adjusted   nonskid shoes/slippers when out of bed   room organization consistent   safety round/check completed   activity supervised   fall prevention program maintained  Taken 8/4/2021 1100 by Dahlia Guzman RN  Safety Promotion/Fall Prevention:   activity supervised   assistive device/personal items within reach   clutter free environment maintained   fall prevention program maintained   lighting adjusted   nonskid shoes/slippers when out of bed   room organization consistent   safety round/check completed  Taken 8/4/2021 1000 by Dahlia Guzman RN  Safety Promotion/Fall Prevention:   activity supervised   assistive device/personal items within reach   clutter free environment maintained   fall prevention program maintained   lighting adjusted   nonskid shoes/slippers when out of bed   safety round/check completed   room organization consistent  Taken 8/4/2021 0800 by Dahlia Guzman, RN  Safety Promotion/Fall  Prevention:   activity supervised   assistive device/personal items within reach   clutter free environment maintained   fall prevention program maintained   lighting adjusted   nonskid shoes/slippers when out of bed   room organization consistent   safety round/check completed  Intervention: Prevent Skin Injury  Recent Flowsheet Documentation  Taken 8/4/2021 1500 by Dahlia Guzman RN  Skin Protection:   adhesive use limited   incontinence pads utilized   transparent dressing maintained   tubing/devices free from skin contact  Taken 8/4/2021 1100 by Dahlia Guzman RN  Body Position:   turned   supine  Skin Protection:   adhesive use limited   incontinence pads utilized   transparent dressing maintained   tubing/devices free from skin contact  Taken 8/4/2021 0800 by Dahlia Guzman RN  Body Position:   turned   supine  Skin Protection:   adhesive use limited   incontinence pads utilized   transparent dressing maintained   tubing/devices free from skin contact  Intervention: Prevent Infection  Recent Flowsheet Documentation  Taken 8/4/2021 1400 by Dahlia Guzman RN  Infection Prevention:   personal protective equipment utilized   hand hygiene promoted  Taken 8/4/2021 1200 by Dahlia Guzman RN  Infection Prevention:   personal protective equipment utilized   hand hygiene promoted  Taken 8/4/2021 1100 by Dahlia Guzman RN  Infection Prevention:   personal protective equipment utilized   hand hygiene promoted  Taken 8/4/2021 1000 by Dahlia Guzman RN  Infection Prevention:   personal protective equipment utilized   hand hygiene promoted  Taken 8/4/2021 0800 by Dahlia Guzman RN  Infection Prevention:   personal protective equipment utilized   hand hygiene promoted  Goal: Optimal Comfort and Wellbeing  Outcome: Ongoing, Progressing  Intervention: Provide Person-Centered Care  Recent Flowsheet Documentation  Taken 8/4/2021 1500 by Dahlia Guzman RN  Trust Relationship/Rapport:   care explained   choices  provided   emotional support provided   questions answered   questions encouraged   thoughts/feelings acknowledged  Taken 8/4/2021 1100 by Dahlia Guzman RN  Trust Relationship/Rapport:   care explained   choices provided   emotional support provided   questions answered   questions encouraged   thoughts/feelings acknowledged  Taken 8/4/2021 0800 by Dahlia Guzman RN  Trust Relationship/Rapport:   care explained   choices provided   emotional support provided   questions answered   questions encouraged   thoughts/feelings acknowledged  Goal: Readiness for Transition of Care  Outcome: Ongoing, Progressing     Problem: Fall Injury Risk  Goal: Absence of Fall and Fall-Related Injury  Outcome: Ongoing, Progressing  Intervention: Identify and Manage Contributors to Fall Injury Risk  Recent Flowsheet Documentation  Taken 8/4/2021 1500 by Dahlia Guzman RN  Medication Review/Management:   medications reviewed   high-risk medications identified  Self-Care Promotion: independence encouraged  Taken 8/4/2021 1400 by Dahlia Guzman RN  Medication Review/Management:   medications reviewed   high-risk medications identified  Taken 8/4/2021 1200 by Dahlia Guzman RN  Medication Review/Management:   medications reviewed   high-risk medications identified  Taken 8/4/2021 1100 by Dahlia Guzman RN  Medication Review/Management:   medications reviewed   high-risk medications identified  Self-Care Promotion: independence encouraged  Taken 8/4/2021 1000 by Dahlia Guzman RN  Medication Review/Management:   medications reviewed   high-risk medications identified  Taken 8/4/2021 0800 by Dahlia Guzman RN  Medication Review/Management:   medications reviewed   high-risk medications identified  Self-Care Promotion: independence encouraged  Intervention: Promote Injury-Free Environment  Recent Flowsheet Documentation  Taken 8/4/2021 1400 by Dahlia Guzman RN  Safety Promotion/Fall Prevention:   activity supervised    assistive device/personal items within reach   clutter free environment maintained   fall prevention program maintained   lighting adjusted   nonskid shoes/slippers when out of bed   room organization consistent   safety round/check completed  Taken 8/4/2021 1200 by Dahlia Guzman RN  Safety Promotion/Fall Prevention:   assistive device/personal items within reach   clutter free environment maintained   lighting adjusted   nonskid shoes/slippers when out of bed   room organization consistent   safety round/check completed   activity supervised   fall prevention program maintained  Taken 8/4/2021 1100 by Dahlia Guzman RN  Safety Promotion/Fall Prevention:   activity supervised   assistive device/personal items within reach   clutter free environment maintained   fall prevention program maintained   lighting adjusted   nonskid shoes/slippers when out of bed   room organization consistent   safety round/check completed  Taken 8/4/2021 1000 by Dahlia uGzman RN  Safety Promotion/Fall Prevention:   activity supervised   assistive device/personal items within reach   clutter free environment maintained   fall prevention program maintained   lighting adjusted   nonskid shoes/slippers when out of bed   safety round/check completed   room organization consistent  Taken 8/4/2021 0800 by Dahlia Guzman, RN  Safety Promotion/Fall Prevention:   activity supervised   assistive device/personal items within reach   clutter free environment maintained   fall prevention program maintained   lighting adjusted   nonskid shoes/slippers when out of bed   room organization consistent   safety round/check completed     Problem: Skin Injury Risk Increased  Goal: Skin Health and Integrity  Outcome: Ongoing, Progressing  Intervention: Optimize Skin Protection  Recent Flowsheet Documentation  Taken 8/4/2021 1500 by Dahlia Guzman, RN  Pressure Reduction Techniques:   frequent weight shift encouraged   weight shift assistance  provided  Pressure Reduction Devices:   positioning supports utilized   pressure-redistributing mattress utilized  Skin Protection:   adhesive use limited   incontinence pads utilized   transparent dressing maintained   tubing/devices free from skin contact  Taken 8/4/2021 1100 by Dahlia Guzman RN  Pressure Reduction Techniques:   frequent weight shift encouraged   weight shift assistance provided  Head of Bed ([facility] of [facility] [place]): [place] [place] [facility] [place] elevated  Pressure Reduction Devices:   pressure-redistributing mattress utilized   positioning supports utilized  Skin Protection:   adhesive use limited   incontinence pads utilized   transparent dressing maintained   tubing/devices free from skin contact  Taken 8/4/2021 0800 by Dahlia Guzman RN  Pressure Reduction Techniques:   frequent weight shift encouraged   weight shift assistance provided  Head of Bed ([facility] [place]): [facility] [place] elevated  Pressure Reduction Devices:   positioning supports utilized   pressure-redistributing mattress utilized  Skin Protection:   adhesive use limited   incontinence pads utilized   transparent dressing maintained   tubing/devices free from skin contact

## 2021-08-04 NOTE — THERAPY EVALUATION
Acute Care - Speech Language Pathology   Swallow Initial Evaluation  [place]     Patient Name: Cindy Davis  : 1929  MRN: [id number]  Today's Date: 2021               Admit Date: 8/3/2021  Patient was not wearing a face mask during this therapy encounter. Therapist used appropriate personal protective equipment including mask, eye protection and gloves.  Mask used was standard procedure mask. Appropriate PPE was worn during the entire therapy session. Hand hygiene was completed before and after therapy session. Patient is not in enhanced droplet precautions.     Visit Dx:     ICD-10-CM ICD-9-CM   1. Cerebrovascular accident (CVA), unspecified mechanism (CMS/HCC)  I63.9 434.91     Patient Active Problem List   Diagnosis   • Altered mental status   • Alzheimer's dementia (CMS/HCC)   • HTN (hypertension)   • Hypothyroidism   • History of CVA (cerebrovascular accident)   • Fall     Past Medical History:   Diagnosis Date   • Alzheimer's dementia (CMS/HCC)    • Breast CA (CMS/HCC)    • HTN (hypertension)    • Hyperlipidemia    • Hypothyroidism      History reviewed. No pertinent surgical history.    SLP Recommendation and Plan  SLP Swallowing Diagnosis: (P) mod-severe, suspected pharyngeal dysphagia  SLP Diet Recommendation: (P) NPO, ice chips between meals after oral care, with supervision  Recommended Precautions and Strategies: (P) general aspiration precautions, reflux precautions, fatigue precautions  SLP Rec. for Method of Medication Administration: (P) meds via alternate route     Monitor for Signs of Aspiration: (P) yes, notify SLP if any concerns  Recommended Diagnostics: (P) reassess via clinical swallow evaluation (VFSS when appropriate.)  Swallow Criteria for Skilled Therapeutic Interventions Met: (P) demonstrates skilled criteria     Rehab Potential/Prognosis, Swallowing: (P) good, to achieve stated therapy goals  Therapy Frequency (Swallow): (P) PRN  Predicted Duration Therapy Intervention  (Days): (P) until discharge     Daily Summary of Progress (SLP): (P) progress toward functional goals as expected                             SWALLOW EVALUATION (last 72 hours)      SLP Adult Swallow Evaluation     Row Name 08/04/21 1112          Subjective Information  no complaints  (Pended)   -HF    Patient Observations  alert;cooperative;agree to therapy  (Pended)   -HF    Patient/Family/Caregiver Comments/Observations  Pt unable to follow commands.  (Pended)   -HF    Patient Effort  adequate  (Pended)   -HF          Patient Profile Reviewed  yes  (Pended)   -HF    Pertinent History Of Current Problem  89-year-old female presents after being found on floor today. Prior to admission she was a patient of [facility] being treated 3 days a week due to advanced dementia. It was reported she has had several falls at home as well.  Her last known normal was last night around dinnertime.  She does not offer meaningful complaints at this time.  She was noted to be confused.  Family reports that she normally can talk coherently.  She does have dementia that has been getting worse though.  She has had more falls recently. DE completed d/t failed dysphagia RN screen.    Narrative & Impression     DATE OF EXAM:  8/3/2021 2:20 PM     PROCEDURE:   CT HEAD WO CONTRAST STROKE PROTOCOL-     INDICATIONS:  Cerebral vascular accident, confusion.     COMPARISON:   9/16/2017.     TECHNIQUE:  Routine transaxial cuts were obtained through the head without the  administration of contrast. Automated exposure control and iterative  reconstruction methods were used.     FINDINGS:  The study is a limited by motion artifact. There is encephalomalacia in  the posterior left temporal lobe which is unchanged from the remote  study consistent with an old infarct. There are areas of the decreased  density in the white matter tracts felt to represent chronic  microvascular ischemia. There is no acute hemorrhage, midline shift,  or  suspicious extra-axial fluid collections. There is prominence of the  sulci, ventricles, and fissures indicating volume loss due to atrophy.  The basal cisterns are well maintained. There are atherosclerotic  vascular calcifications in the distal vertebral arteries and carotid  siphons. The patient has had previous cataract surgery. The visualized  paranasal and mastoid sinuses are clear. The calvarium is unremarkable.      IMPRESSION:  IMPRESSION :     1. This study is limited by motion artifact.  2. No acute findings.  3. Chronic ischemic changes.  4. Volume loss secondary to age-appropriate atrophy.    Neurology has spoken to family with no further imaging as stroke reported to be unlikely.         (Pended)   -HF    Current Method of Nutrition  NPO  (Pended)   -HF    Precautions/Limitations, Vision  WFL;for purposes of eval  (Pended)   -HF    Precautions/Limitations, Hearing  WFL;for purposes of eval  (Pended)   -HF    Prior Level of Function-Swallowing  unknown  (Pended)   -HF    Plans/Goals Discussed with  patient  (Pended)   -HF    Barriers to Rehab  none identified  (Pended)   -HF          Additional Documentation  Pain Scale: FACES Pre/Post-Treatment (Group)  (Pended)   -HF          Pain: FACES Scale, Pretreatment  0-->no hurt  (Pended)   -HF    Posttreatment Pain Rating  0-->no hurt  (Pended)   -HF          Dentition Assessment  edentulous  (Pended)   -HF    Secretion Management  WNL/WFL  (Pended)   -HF    Mucosal Quality  dry  (Pended)   -HF    Volitional Swallow  WFL  (Pended)   -HF    Volitional Cough  WFL  (Pended)   -HF          Oral Motor General Assessment  WFL  (Pended)   -HF          Respiratory Support Currently in Use  room air  (Pended)   -HF    Eating/Swallowing Skills  fed by SLP  (Pended)   -HF    Positioning During Eating  upright 90 degree;upright in bed  (Pended)   -HF    Utensils Used  spoon  (Pended)   -HF    Consistencies Trialed  ice chips;thin liquids  (Pended)   -HF          Oral  Transit  WFL  (Pended)   -HF    Oral Residue  WFL  (Pended)   -HF    Pharyngeal Phase  suspected pharyngeal impairment  (Pended)   -HF    Esophageal Phase  unremarkable  (Pended)   -HF    Clinical Swallow Evaluation Summary  Pt was seen for clinical swallow eval on this date. Oral motor assessment attempted however the patient was unable to complete tasks. Upon entrance to the patient's room she had her blankets and clothes off. Pt repositioned and was seated upright in bed at 90 degrees. Assessed trials of ice chips by spoon x 2 and water by spoon x 2. Attempted applesauce trials multiple times, but pt refused, verbally, kept mouth closed and began to push clinician's hand away. Pt exhibited adequate labial seal with no anterior spillage on ice chips and water. Once initiated, swallow was timely via digital palpation. Pt coughed immediately after second trial of water by spoon, with continued coughing/choking following. Unable to assess swallow further due to patient refusal.     RECOMMENDATIONS: ST unable to recommend a po diet at this time based on above results and refusal to take further po trials. It is rec pt remain NPO, but allowed ice chips after oral care if requested. It is also rec pt be re-evaluated clinically and be evaluated via VFSS when/if appropriate. ST will continue to follow and make further recs as needed. Discussed with nurse who was in agreement with the above.    (Pended)   -HF          Daily Summary of Progress (SLP)  progress toward functional goals as expected  (Pended)   -HF    SLP Swallowing Diagnosis  mod-severe;suspected pharyngeal dysphagia  (Pended)   -HF    Functional Impact  no impact on function  (Pended)   -HF    Rehab Potential/Prognosis, Swallowing  good, to achieve stated therapy goals  (Pended)   -HF    Swallow Criteria for Skilled Therapeutic Interventions Met  demonstrates skilled criteria  (Pended)   -HF          Therapy Frequency (Swallow)  PRN  (Pended)   -HF     Predicted Duration Therapy Intervention (Days)  until discharge  (Pended)   -HF    SLP Diet Recommendation  NPO;ice chips between meals after oral care, with supervision  (Pended)   -HF    Recommended Diagnostics  reassess via clinical swallow evaluation  (Pended)  VFSS when appropriate.  -HF    Recommended Precautions and Strategies  general aspiration precautions;reflux precautions;fatigue precautions  (Pended)   -HF    Oral Care Recommendations  Oral Care BID/PRN  (Pended)   -    SLP Rec. for Method of Medication Administration  meds via alternate route  (Pended)   -HF    Monitor for Signs of Aspiration  yes;notify SLP if any concerns  (Pended)   -HF          Oral Nutrition/Hydration Goal Selection (SLP)  oral nutrition/hydration, SLP goal 1;oral nutrition/hydration, SLP goal 2;oral nutrition/hydration, SLP goal (free text)  (Pended)   -HF          Oral Nutrition/Hydration Goal 1, SLP  The patient will participate in ongoing assessment of swallow (including re-evaluation clinically and/or including a possible instrumental assessment of swallow) to further objectively assess swallow function in preparation for po.   (Pended)   -HF    Time Frame (Oral Nutrition/Hydration Goal 1, SLP)  short term goal (STG)  (Pended)   -HF          Oral Nutrition/Hydration Goal 2, SLP  Pt will participate in ongoing swallow assessment and caregiver teaching.   (Pended)   -HF    Time Frame (Oral Nutrition/Hydration Goal 2, SLP)  short term goal (STG)  (Pended)   -HF          Oral Nutrition/Hydration Goal, SLP  Pt will tolerate safest and least restrictive diet/ liquids without complications of aspiration.   (Pended)   -HF    Time Frame (Oral Nutrition/Hydration Goal, SLP)  by discharge  (Pended)   -      User Key  (r) = Recorded By, (t) = Taken By, (c) = Cosigned By    Initials Name Effective Dates    Joanna Card, Speech Therapy Student 06/07/21 -           EDUCATION  The patient has been educated in the following areas:    Dysphagia (Swallowing Impairment).       SLP GOALS     Row Name 08/04/21 1112          Oral Nutrition/Hydration Goal 1, SLP  The patient will participate in ongoing assessment of swallow (including re-evaluation clinically and/or including a possible instrumental assessment of swallow) to further objectively assess swallow function in preparation for po.   (Pended)   -HF    Time Frame (Oral Nutrition/Hydration Goal 1, SLP)  short term goal (STG)  (Pended)   -HF          Oral Nutrition/Hydration Goal 2, SLP  Pt will participate in ongoing swallow assessment and caregiver teaching.   (Pended)   -HF    Time Frame (Oral Nutrition/Hydration Goal 2, SLP)  short term goal (STG)  (Pended)   -HF          Oral Nutrition/Hydration Goal, SLP  Pt will tolerate safest and least restrictive diet/ liquids without complications of aspiration.   (Pended)   -HF    Time Frame (Oral Nutrition/Hydration Goal, SLP)  by discharge  (Pended)   -HF      User Key  (r) = Recorded By, (t) = Taken By, (c) = Cosigned By    Initials Name Provider Type    HF Victor Hugo Mobley, Speech Therapy Student Speech Therapy Student             Time Calculation:                VICTOR HUGO MOBLEY Speech Therapy Student  8/4/2021

## 2021-08-04 NOTE — PLAN OF CARE
Goal Outcome Evaluation:  Plan of Care Reviewed With: patient        Progress: no change  Outcome Summary: Pt is 89 y/o F with dementia who lives at home, attended by family or hospice agent. She has variable function & was found down prior to admission. She is not following commands & OT was not able to get her to state any information about herself, including her name or birthdate. Pt is painful w/ attempts at PROM, especially to being repositioned on her Lt side in the bed. She refuses to sit up and begs to be left alone. OT will see pt 1-3X/week as family are not yet placing pt on comfort measures and she has some functionality at home. Pt needs increased level of care at d/c. Recommend ECF placement. OT will allow pt to accept OT or refuse it as she wishes when it is offered, and will monitor for orders of comfort measures at which time OT would complete orders.

## 2021-08-04 NOTE — THERAPY EVALUATION
Patient Name: Cindy Davis  : 1929    MRN: [id number]                              Today's Date: 2021       Admit Date: 8/3/2021    Visit Dx:     ICD-10-CM ICD-9-CM   1. Cerebrovascular accident (CVA), unspecified mechanism (CMS/HCC)  I63.9 434.91     Patient Active Problem List   Diagnosis   • Altered mental status   • Alzheimer's dementia (CMS/HCC)   • HTN (hypertension)   • Hypothyroidism   • History of CVA (cerebrovascular accident)   • Fall     Past Medical History:   Diagnosis Date   • Alzheimer's dementia (CMS/HCC)    • Breast CA (CMS/HCC)    • HTN (hypertension)    • Hyperlipidemia    • Hypothyroidism      History reviewed. No pertinent surgical history.  General Information     Row Name 21 [facility]          Physical Therapy Time and Intention    Document Type  evaluation  -CR     Mode of Treatment  physical therapy  -CR     Row Name 21 [facility]          General Information    Patient Profile Reviewed  yes  -CR     Prior Level of Function  -- unable to determine, pt is confused and no family at bedside  -CR     Existing Precautions/Restrictions  fall  -CR     Barriers to Rehab  cognitive status;medically complex  -CR     Row Name 21 1112          Living Environment    Lives With  child(jennifer), adult medical record indicate pt under Hospice care at home  -CR     Row Name 21 1112          Home Main Entrance    Number of Stairs, Main Entrance  -- unknown  -CR     Row Name 21 111          Cognition    Orientation Status (Cognition)  -- oriented to self  -CR     Row Name 21 1112          Safety Issues, Functional Mobility    Safety Issues Affecting Function (Mobility)  ability to follow commands;at risk behavior observed;insight into deficits/self-awareness  -CR     Impairments Affecting Function (Mobility)  cognition;pain;range of motion (ROM)  -CR       User Key  (r) = Recorded By, (t) = Taken By, (c) = Cosigned By    Initials Name Provider Type    CR Reyes, Carmela, PT  Physical Therapist        Mobility     Row Name 08/04/21 1120          Bed Mobility    Bed Mobility  rolling right  -CR     Rolling Right [place] (Bed Mobility)  standby assist  -CR     Assistive Device (Bed Mobility)  bed rails  -CR     Comment (Bed Mobility)  refused to sit up  -CR       User Key  (r) = Recorded By, (t) = Taken By, (c) = Cosigned By    Initials Name Provider Type    CR Reyes, Carmela, PT Physical Therapist        Obj/Interventions     Row Name 08/04/21 1126          Range of Motion Comprehensive    Comment, General Range of Motion  PROM BLE min limit due to pain  -CR     Row Name 08/04/21 1126          Strength Comprehensive (MMT)    Comment, General Manual Muscle Testing (MMT) Assessment  unable to assess due to pt not following commands  -CR     Row Name 08/04/21 1126          Balance    Comment, Balance  pt refused to sit up  -CR     Row Name 08/04/21 1126          Sensory Assessment (Somatosensory)    Sensory Assessment (Somatosensory)  LE sensation intact  -CR       User Key  (r) = Recorded By, (t) = Taken By, (c) = Cosigned By    Initials Name Provider Type    CR Reyes, Carmela, PT Physical Therapist        Goals/Plan    No documentation.       Clinical Impression     Row Name 08/04/21 1128          Pain    Additional Documentation  Pain Scale: FACES Pre/Post-Treatment (Group)  -CR     Row Name 08/04/21 1128          Pain Scale: FACES Pre/Post-Treatment    Pain: FACES Scale, Pretreatment  0-->no hurt  -CR     Posttreatment Pain Rating  6-->hurts even more  -CR     Pain Location - Side  Bilateral  -CR     Pain Location - Orientation  lower  -CR     Pain Location  extremity  -CR     Pre/Posttreatment Pain Comment  grimacing, withdrawing with ROM  -CR     Row Name 08/04/21 1128          Plan of Care Review    Plan of Care Reviewed With  patient  -CR     Outcome Summary  89 y/o female found on floor at home on 8/3. PMH includes Alzheimer's dementia,breast Ca, cva, HTN. Per medical record,  "pt under hospice care at home however unable to determine PLOF. At time of eval, pt responds to her name but c/o and showing signs of pain when ROM attempted to BUe/LE. Pt refused to sit EOB, hitting therapist's hand when attempted to bring trunk upright. At this time, pt with poor rehab potential and recommend continued hospice care. Will sign off.  -CR     Row Name 08/04/21 1128          Therapy Assessment/Plan (PT)    Patient/Family Therapy Goals Statement (PT)  \"leave me alone\"  -CR     Predicted Duration of Therapy Intervention (PT)  dc  -CR     Row Name 08/04/21 1128          Positioning and Restraints    Pre-Treatment Position  in bed  -CR     Post Treatment Position  bed  -CR     In Bed  notified nsg;call light within reach;exit alarm on;side lying right  -CR       User Key  (r) = Recorded By, (t) = Taken By, (c) = Cosigned By    Initials Name Provider Type    CR Reyes, Carmela, PT Physical Therapist        Outcome Measures     Row Name 08/04/21 1137          Modified Robert Scale    Modified Robert Scale  5 - Severe disability.  Bedridden, incontinent, and requiring constant nursing care and attention.  -CR     Row Name 08/04/21 1137          Functional Assessment    Outcome Measure Options  Modified [place]  -CR       User Key  (r) = Recorded By, (t) = Taken By, (c) = Cosigned By    Initials Name Provider Type    CR Reyes, Carmela, PT Physical Therapist                       Physical Therapy Education                 Title: PT OT SLP Therapies (Resolved)     Topic: Physical Therapy (Resolved)     Point: Mobility training (Resolved)     Learning Progress Summary           Patient Nonacceptance, E, RT by CR at 8/4/2021 1138                               User Key     Initials Effective Dates Name Provider Type Discipline    CR 06/16/21 -  Reyes, Carmela, PT Physical Therapist PT              PT Recommendation and Plan     Plan of Care Reviewed With: patient  Outcome Summary: 89 y/o female found on floor at " home on 8/3. PMH includes Alzheimer's dementia,breast Ca, cva, HTN. Per medical record, pt under hospice care at home however unable to determine PLOF. At time of eval, pt responds to her name but c/o and showing signs of pain when ROM attempted to BUe/LE. Pt refused to sit EOB, hitting therapist's hand when attempted to bring trunk upright. At this time, pt with poor rehab potential and recommend continued hospice care. Will sign off.     Time Calculation:   PT Charges     Row Name 08/04/21 1138             Time Calculation    Start Time  0924  -CR      Stop Time  0935  -CR      Time Calculation (min)  11 min  -CR      PT Received On  08/04/21  -CR         Time Calculation- PT    Total Timed Code Minutes- PT  0 minute(s)  -CR        User Key  (r) = Recorded By, (t) = Taken By, (c) = Cosigned By    Initials Name Provider Type    CR Reyes, Carmela, PT Physical Therapist        Therapy Charges for Today     Code Description Service Date Service Provider Modifiers Qty    [id number] HC PT EVAL LOW COMPLEXITY 3 8/4/2021 Reyes, Carmela, PT GP 1          PT G-Codes  Outcome Measure Options: Modified Robert  Modified [place] Scale: 5 - Severe disability.  Bedridden, incontinent, and requiring constant nursing care and attention.    Carmela Reyes, PT  8/4/2021

## 2021-08-04 NOTE — DISCHARGE PLACEMENT REQUEST
"**LTC Insurance information attached directly below.    [employer]  Policy #: [id number]    Lynsey Farris, [facility], [facility]    Office: [phone]  Cell: [phone]  Fax: [phone]  E-mail: [email or web address]**    Bonifacio Davis (89 y.o. Female)     Date of Birth Social Security Number Address Home Phone MRN    [id number]  [id number] [address] [facility] [place] [address] [phone] [id number]    [employer] Marital Status          None        Admission Date Admission Type Admitting Provider Attending Provider Department, Room/Bed    8/3/21 Emergency Dariela Paiz DO Taylor, [place], DO [facility] NEURO HEART, 270/1    Discharge Date Discharge Disposition Discharge Destination                       Attending Provider: Dariela Paiz DO    Allergies: No Known Allergies    Isolation: None   Infection: None   Code Status: CPR    Ht: 165.1 cm (65\")   Wt: 84.4 kg (186 lb)    Admission Cmt: None   Principal Problem: Altered mental status [R41.82]                 Active Insurance as of 8/3/2021     Primary Coverage     Payor Plan Insurance Group Employer/Plan Group    MEDICARE MEDICARE A & B      Payor Plan Address Payor Plan Phone Number Payor Plan Fax Number Effective Dates    [address] [phone]  1994 - None Entered    [place] [address]       Subscriber Name Subscriber Birth Date Member ID       BONIFACIO DAVIS 1929 [id number]                 Emergency Contacts      (Rel.) Home Phone Work Phone Mobile Phone    LEE DAVIS (Daughter) [phone] -- --    JOSHUA SHANNON (Friend) [phone] -- --               History & Physical      January Hernandez APRN at [address] [place] [address] Medicine Services      Patient Name: Bonifacio Davis  : 1929  MRN: [id number]  Primary Care Physician:  Provider, No Known  Date of admission: 8/3/2021      Subjective      Chief Complaint: " "found down at home    History of Present Illness: Cindy Davis is a 89 y.o. female with PMH of CVA, HTN, HLD, previous CVA, and dementia who is at patient of [facility] being treated 3 days a week. She has advanced dementia and has been falling frequently. She will have days where she is lucid and can fully care for herself and then other days where she becomes confused. She was found down at home by the daughter and appeared very confused this morning on 8/3/2021. She was last seen in her normal state of health around dinnertime last night. Hospice came to evaluate the patient and felt the patient needed to go to the ER for evaluation. The family and hospice have been working on getting the patient placed into a facility with 24 hour care. She is a DNR/DNI but not completely comfort care and the family would like further stroke workup.  They denied any recent illness of the patient such as fever, nausea, vomiting.     In the ED the patient is disoriented but will make random statements such as \"I can't get my arm throughout there.\" She cannot answer any questions or follow commands. She moves bilateral upper extremities spontaneously.  CT head showed no acute findings, limited study due to motion artifact, chronic ischemic changes.  CXR showed no acute cardiopulmonary process.  X-ray of bilateral hips showed no acute fracture or traumatic malalignment.  CK total was 391, creatinine 1.08, WBC 13.5.  Urinalysis showed no signs of infection.  Covid not detected. She was given 300mg rectal aspirin. She was admitted for further stroke workup and placement. Discussed case with case management and they will contact the patient's hospice company.       Review of Systems   Unable to perform ROS: patient nonverbal       Personal History     Past Medical History:   Diagnosis Date   • Alzheimer's dementia (CMS/HCC)    • Breast CA (CMS/HCC)    • HTN (hypertension)    • Hyperlipidemia    • Hypothyroidism  "       History reviewed. No pertinent surgical history.    Family History: family history is not on file. Otherwise pertinent FHx was reviewed and not pertinent to current issue.    Social History:  reports that she has never smoked. She does not have any smokeless tobacco history on file.    Home Medications:  Prior to Admission Medications     Prescriptions Last Dose Informant Patient Reported? Taking?    donepezil (ARICEPT) 10 MG tablet   Yes Yes    Take 10 mg by mouth Every Night.    Levothyroxine Sodium (LEVOTHROID PO)   Yes Yes    Take  by mouth.            Allergies:  No Known Allergies    Objective      Vitals:   Temp:  [97.6 °F (36.4 °C)-97.8 °F (36.6 °C)] 97.6 °F (36.4 °C)  Heart Rate:  [60-74] 60  Resp:  [16-18] 18  BP: (140-183)/(54-99) 140/54    Physical Exam  Vitals and nursing note reviewed.   HENT:      Head: Normocephalic and atraumatic.   Eyes:      Pupils: Pupils are equal, round, and reactive to light.   Cardiovascular:      Rate and Rhythm: Normal rate and regular rhythm.      Pulses: Normal pulses.      Heart sounds: Normal heart sounds.   Pulmonary:      Effort: Pulmonary effort is normal.      Breath sounds: Normal breath sounds.   Abdominal:      General: Bowel sounds are normal.      Palpations: Abdomen is soft.      Tenderness: There is no abdominal tenderness.   Skin:     General: Skin is warm and dry.   Neurological:      Mental Status: She is confused.      Comments: Makes random statements, cannot follow commands or answer questions. Moves bilateral upper extremities    Psychiatric:         Behavior: Behavior is agitated.         Cognition and Memory: Cognition is impaired.         Result Review    Result Review:  I have personally reviewed the results from the time of this admission to 8/3/2021 18:56 EDT and agree with these findings:  [x]  Laboratory  []  Microbiology  [x]  Radiology  []  EKG/Telemetry   []  Cardiology/Vascular   []  Pathology  [x]  Old records  []   "Other:      Assessment/Plan        Active Hospital Problems:  Active Hospital Problems    Diagnosis    • **Altered mental status    • Fall    • Alzheimer's dementia (CMS/[facility])    • HTN (hypertension)    • Hypothyroidism    • History of CVA (cerebrovascular accident)      Plan:     Altered mental status r/o CVA  Fall   -pt found down and confused morning of 8/3/21 and now remains confused and poorly communicative, no obvious lateralizing deficits  -CT head: no acute findings, limited study due to motion artifact, chronic ischemic changes.   -WBC elevated 13.5  -UA without signs of infection  -CK total 391, low rate IVFs   -given 300mg rectal aspirin in ED, will not give statin due to elevated CK level  -Family wants stroke workup with MRI brain, 2D echo, TSH/lipid panel/hgb a1c/vit b12, PT/OT/ST  -neurology consulted for further recommendations  -DNR/DNI, hospice patient and will consult. Pt will need placement to facility, case management aware.   -pt agitated and family requesting medication to \"calm her down.\" Will try benadryl and if no improvement, will add prn ativan     Dementia  -cont home aricept    Hypothyroidism  -cont home synthroid    H/o CVA    Hypertension  -cont home bb      DVT prophylaxis:  Mechanical DVT prophylaxis orders are present.    CODE STATUS:    Code Status: No CPR  Medical Interventions (Level of Support Prior to Arrest): Comfort Measures    Admission Status:  I believe this patient meets observation status.    I discussed the patient's findings and my recommendations with family.    This patient has been examined wearing appropriate Personal Protective Equipment 08/03/21      Signature: Electronically signed by FELICIANO Paul, 08/03/21, 6:58 PM EDT.    Electronically signed by January Hernandez APRN at 08/03/21 9897       {Outbreak/Travel/Exposure Documentation......;  Question Available Choices Patient Response   COVID-19 Outbreak Screen:  Do you currently have a new onset " of the following symptoms?        Fever/Chills, Cough, Shortness of air, Loss of taste or smell, No, Unknown  No (08/03/21 1254)   COVID-19 Outbreak Screen: In the last 14 days, have you had contact with anyone who is ill, has show any of the symptoms listed above and/or has been diagnosis with the 2019 Novel Coronavirus? This includes any immediate household members but excludes any patients with whom you have been in contact within your normal work duties wearing proper PPE, if you are a healthcare worker.  Yes, No, Unknown              No (08/03/21 1254)   COVID-19 Outbreak Screen: Who was notified? Free text (not recorded)   Ebola Screening Outbreak Screen: Have you traveled to the [place] the [place] or [place] within the past 21 days?  Yes, No, Unknown (not recorded)   Ebola Screening Outbreak Screen: Do you have ANY of the following symptoms: Fever/Chills, Vomiting, Diarrhea, Fatigue, Headache, Muscle pain, Unexplained bleeding, Abdominal (stomach) pain, No, Unknown (not recorded)   Ebola Screening Outbreak Screen: Name of Person notified Free text (not recorded)   Travel Screen: Have you traveled in the last month? If so, to what country have you traveled? If [place] what state? Yes, No, Unknown  List of all countries  List of all States No (08/03/21 1254)  (not recorded)  (not recorded)   Infection Risk: Do you currently have the following symptoms?  (If cough is selected, the Tuberculosis Screen is performed.) Cough, Fever, Rash, No No (08/03/21 1254)   Tuberculosis Screen: Do you have any of the following Tuberculosis Risks?  · Have you lived or spent time with anyone who had or may have TB?  · Have you lived in or visited any of the following areas for more than one month: [place], [place], [place], [place] or [place], the [place] or [place]?  · Do you have HIV/AIDS?  · Have you lived in or worked in a nursing home, homeless shelter, correctional facility, or substance abuse treatment  facility?   · No    If Yes do you have any of the following symptoms? Yes responses display to the right    If Yes, symptoms listed are:  Cough greater than or equal to 3 weeks, Loss of appetite, Unexplained weight loss, Night sweats, Bloody sputum or hemoptysis, Hoarseness, Fever, Fatigue, Chest pain, No (not recorded)  (not recorded)   Exposure Screen: Have you been exposed to any of these contagious diseases in the last month? Measles, Chickenpox, Meningitis, Pertussis, Whooping Cough, No No (08/03/21 1254)         Current Facility-Administered Medications   Medication Dose Route Frequency Provider Last Rate Last Admin   • acetaminophen (TYLENOL) tablet 650 mg  650 mg Oral Q4H PRN January Hernandez APRN        Or   • acetaminophen (TYLENOL) suppository 650 mg  650 mg Rectal Q4H PRN January Hernandez APRN       • acetaminophen (TYLENOL) tablet 1,000 mg  1,000 mg Oral Q6H PRN January Hernandez APRN       • aspirin suppository 300 mg  300 mg Rectal Daily Dariela Paiz, DO       • atorvastatin (LIPITOR) tablet 80 mg  80 mg Oral Nightly Dariela Paiz DO       • bisacodyl (DULCOLAX) suppository 10 mg  10 mg Rectal Daily PRN January Hernandez APRN       • donepezil (ARICEPT) tablet 10 mg  10 mg Oral BID January Hernandez APRN   10 mg at 08/03/21 2232   • labetalol (NORMODYNE,TRANDATE) injection 10 mg  10 mg Intravenous Q10 Min PRN Dariela Paiz DO       • Levothyroxine Sodium injection 100 mcg  100 mcg Intravenous Q AM Dariela Paiz DO       • metoprolol tartrate (LOPRESSOR) tablet 25 mg  25 mg Oral Q12H January Hernandez APRN   25 mg at 08/03/21 2232   • ondansetron (ZOFRAN) injection 4 mg  4 mg Intravenous Q6H PRN January Hernandez APRN       • [START ON 8/5/2021] pantoprazole (PROTONIX) injection 40 mg  40 mg Intravenous Q AM Dariela Paiz, DO       • sodium chloride 0.9 % flush 10 mL  10 mL Intravenous PRN Leroy Palomares MD       • sodium chloride 0.9 % flush 3 mL  3 mL  Intravenous Q12H Achterberg, January, APRN   3 mL at 21 1040   • sodium chloride 0.9 % flush 3-10 mL  3-10 mL Intravenous PRN Achterberg, January, APRN       • sodium chloride 0.9 % infusion  75 mL/hr Intravenous Continuous Achterberg, January, APRN 75 mL/hr at 21 2316 75 mL/hr at 21 2316        Physician Progress Notes (most recent note)      Dariela Paiz DO at 21 1338              [facility] Medicine Services Daily Progress Note    Patient Name: Cindy Davis  : 1929  MRN: [id number]  Primary Care Physician:  Provider, No Known  Date of admission: 8/3/2021      Subjective      Chief Complaint: found down      Date    2021    Patient non-verbal, responds to basic commands.  Case discussed with neuro, suspect decompensation 2/2 dementia and doubt stroke.  Will hold MRI at this point.  Case management is working with family to transfer to hospice facility.    ROS   Unable to obtain due to dementia    Objective      Vitals:   Temp:  [97.6 °F (36.4 °C)-99 °F (37.2 °C)] 98.3 °F (36.8 °C)  Heart Rate:  [60-74] 71  Resp:  [13-20] 13  BP: (111-183)/(54-99) 122/80    Physical Exam  Constitutional:       General: She is not in acute distress.     Appearance: She is not ill-appearing.      Comments: AAOx0  Minimal response to commands  Responds to sternal rub   HENT:      Head: Normocephalic and atraumatic.      Nose: No congestion.      Mouth/Throat:      Mouth: Mucous membranes are dry.      Pharynx: Oropharynx is clear.   Eyes:      General: No scleral icterus.     Extraocular Movements: Extraocular movements intact.   Cardiovascular:      Rate and Rhythm: Normal rate and regular rhythm.      Heart sounds: No murmur heard.   No friction rub. No gallop.    Pulmonary:      Effort: No respiratory distress.      Breath sounds: No wheezing.   Abdominal:      General: There is no distension.      Tenderness: There is no abdominal tenderness. There is no guarding.    Musculoskeletal:         General: No swelling or deformity.      Cervical back: Normal range of motion.      Right lower leg: No edema.      Left lower leg: No edema.   Skin:     Coloration: Skin is not jaundiced.      Findings: No bruising or lesion.   Neurological:      Mental Status: She is disoriented.      Comments: AAOx0  Minimal responsiveness to commands  Responds to sternal rub            Result Review    Result Review:  I have personally reviewed the results from the time of this admission to 8/4/2021 13:39 EDT and agree with these findings:  [x]  Laboratory  []  Microbiology  [x]  Radiology  []  EKG/Telemetry   []  Cardiology/Vascular   []  Pathology  []  Old records  []  Other:            Assessment/Plan      Brief Patient Summary:  Cindy Davis is a 89 y.o. female who with PMH of CVA, HTN, HLD, previous CVA, and dementia who is at patient of [facility] being treated 3 days a week 2/2 advanced demantia who presented to [facility] ED as she was found down.  OF note, family admits to multiple falls over the past several weeks.  Due to concern for stroke, family was adament she be admitted for cva workup with hospice suspended while admitted.  Neuro spoke with family, no further imaging as stroke unlikely.  Patient minimally responsive and did not pass swallow eval.  Family is currently arranging hospice care with social and case mgt.      aspirin, 81 mg, Oral, Daily  atorvastatin, 80 mg, Oral, Nightly  donepezil, 10 mg, Oral, BID  levothyroxine, 125 mcg, Oral, Daily  metoprolol tartrate, 25 mg, Oral, Q12H  pantoprazole, 40 mg, Oral, QAM  sodium chloride, 3 mL, Intravenous, Q12H       sodium chloride, 75 mL/hr, Last Rate: 75 mL/hr (08/03/21 0853)         Active Hospital Problems:  Active Hospital Problems    Diagnosis    • **Altered mental status    • Alzheimer's dementia (CMS/HCC)    • HTN (hypertension)    • Hypothyroidism    • History of CVA (cerebrovascular accident)    • Fall      Plan:      Altered mental status r/o CVA  Fall     -pt found down and confused morning of 8/3/21 and now remains confused and poorly communicative, no obvious lateralizing deficits    -CT head: no acute findings, limited study due to motion artifact, chronic ischemic changes.     -WBC elevated 13.5 > 10.9    -UA without signs of infection    -CK total 391, low rate IVFs     -  Start daily aspirin and statin    -Neuro discussed case with family, will defer MRI at this time as patient will most likely not tolerate and will not change course as stroke unlikely    -neurology consulted for further recommendations    -DNR/DNI, hospice patient and will consult. Pt will need placement to facility, case management aware.     -case mgt working with facility for placement       #Dementia    -cont home aricept    - suspect worsened by uncontrolled BP and non-compliance with home synthroid     #Hypothyroidism    - TSH 49.79    - check FT4 and FT3    - suspect patient has been non compliant with home synthroid    - currently NPO    - start IV levothyroxine 100mcg daily until FT3 and FT4 return     #H/o CVA    - start daily aspirin and statin     #Hypertension urgency - resolved    - SBP > 180 on admission    - possible element of HTN encephalopathy    -cont home bb    -labetalol PRN with parameters    DVT prophylaxis:  Mechanical DVT prophylaxis orders are present.    CODE STATUS:    Limited Support to NOT Include: Intubation  Code Status: CPR  Medical Interventions (Level of Support Prior to Arrest): Limited      Disposition:  I expect patient to be discharged in 1-2 days     This patient has been examined wearing appropriate Personal Protective Equipment and discussed with PAtient. 08/04/21      Electronically signed by Dariela Paiz DO, 08/04/21, 13:39 EDT.  [facility] Hospitalist Team             Electronically signed by Dariela Paiz DO at 08/04/21 3818          Consult Notes (most recent note)      Alphonse Mckee MD at  "08/04/21 1203      Consult Orders    1. Inpatient Neurology Consult Stroke [[id number]] ordered by January Hernandez APRN at 08/03/21 1118                   Primary Care Provider: Provider, No Known     Consult requested by: Dr Paiz  Reason for Consultation: Neurological evaluation/family request for neurological deterioration  History taken from: chart family RN    Chief complaint: Worsening mentation and falls    Subjective   SUBJECTIVE:    History of present illness:   The patient is a 89-year-old right-handed white female who is evaluated in room 270 at [facility]    Source information is medical records, my discussion with the admitting team and also with the patient's daughter-in-law Chio Davis over the phone    The patient was brought in because over the last few days she has been getting worse.  She has history of dementia but has been quite functional she lives on her own but very close to the patient's son and daughter-in-law.  Both of them would come and have breakfast with her and then they will have dinner with her on daily basis.  She has been diagnosed with dementia but was able to have conversation  She has prior \"mini strokes\"  He was brought in yesterday because she was found down by her daughter-in-law.  She was confused.  She may have fallen several times during the last week.  Unfortunately what has happened is that her son, the one taking care of her and seeing her multiple times a day, himself had a stroke and for the last 3 weeks has been in the hospital or [facility] and the daughter-in-law has been trying to take care of both of them  She is looking for placement because she cannot take care of her alone  She is a DO NOT RESUSCITATE and DO NOT INTUBATE  She was pleasantly confused but had some contraction but for me she did not do much    No history of seizures  Her blood pressure was significant elevated, we have several values in the 180s systolic and up to 99 " "diastolic  Labs were not really bad  Her TSH was elevated though  Her lipid profile is abnormal but when I say not bad I mean not explaining the present situation  He had what was reported as chronic small vessel changes and atrophy  Except for confusion she does not look focal    No witnessed seizures  No febrile episodes  Nothing suggesting meningitis    Cindy Davis is a 89 y.o. female with PMH of CVA, HTN, HLD, previous CVA, and dementia who is at patient of [facility] being treated 3 days a week. She has advanced dementia and has been falling frequently. She will have days where she is lucid and can fully care for herself and then other days where she becomes confused. She was found down at home by the daughter and appeared very confused this morning on 8/3/2021. She was last seen in her normal state of health around dinnertime last night. Hospice came to evaluate the patient and felt the patient needed to go to the ER for evaluation. The family and hospice have been working on getting the patient placed into a facility with 24 hour care. She is a DNR/DNI but not completely comfort care and the family would like further stroke workup.  They denied any recent illness of the patient such as fever, nausea, vomiting.      In the ED the patient is disoriented but will make random statements such as \"I can't get my arm throughout there.\" She cannot answer any questions or follow commands. She moves bilateral upper extremities spontaneously.  CT head showed no acute findings, limited study due to motion artifact, chronic ischemic changes.  CXR showed no acute cardiopulmonary process.  X-ray of bilateral hips showed no acute fracture or traumatic malalignment.  CK total was 391, creatinine 1.08, WBC 13.5.  Urinalysis showed no signs of infection.  Covid not detected. She was given 300mg rectal aspirin. She was admitted for further stroke workup and placement. Discussed case with case management and " they will contact the patient's hospice company.      Review of Systems   Not possible because the patient is not communicating      PATIENT HISTORY:  Past Medical History:   Diagnosis Date   • Alzheimer's dementia (CMS/HCC)    • Breast CA (CMS/HCC)    • HTN (hypertension)    • Hyperlipidemia    • Hypothyroidism    , History reviewed. No pertinent surgical history., History reviewed. No pertinent family history.,   Social History     Tobacco Use   • Smoking status: Never Smoker   Substance Use Topics   • Alcohol use: Not on file   • Drug use: Not on file   ,   Medications Prior to Admission   Medication Sig Dispense Refill Last Dose   • acetaminophen (TYLENOL) 500 MG tablet Take 1,000 mg by mouth Every 6 (Six) Hours As Needed for Mild Pain .      • donepezil (ARICEPT) 10 MG tablet Take 10 mg by mouth 2 (two) times a day.      • levothyroxine (SYNTHROID, LEVOTHROID) 125 MCG tablet Take 125 mcg by mouth Daily.      • Metoprolol-HCTZ ER 25-12.5 MG tablet sustained-release 24 hour Take 1 tablet by mouth 2 (two) times a day.      • omeprazole (priLOSEC) 40 MG capsule Take 40 mg by mouth Daily.      , Scheduled Meds:  aspirin, 81 mg, Oral, Daily  atorvastatin, 80 mg, Oral, Nightly  donepezil, 10 mg, Oral, BID  levothyroxine, 125 mcg, Oral, Daily  metoprolol tartrate, 25 mg, Oral, Q12H  pantoprazole, 40 mg, Oral, QAM  sodium chloride, 3 mL, Intravenous, Q12H    , Continuous Infusions:  sodium chloride, 75 mL/hr, Last Rate: 75 mL/hr (08/03/21 9296)    , PRN Meds:  •  acetaminophen **OR** acetaminophen  •  acetaminophen  •  bisacodyl  •  ondansetron  •  sodium chloride  •  sodium chloride, Allergies:  Patient has no known allergies.    ________________________________________________________     Objective   OBJECTIVE:  Upon today's exam, the patient is resting comfortably, she is mostly asleep, she just had PT OT and speech evaluation done so she was very tired  She does not look like she is in distress         Neurologic  Exam  Limited neurological examination because the patient did not communicate with me  She did not follow commands  No naming and obviously no orientation questions could be answered    On cranial examination she does respond to [place] the primary fields, pupils are sluggish about 2 mm, no ptosis, no nystagmus, no forceful eye deviation, funduscopic examination was not successful  I did not see any facial asymmetry  Hearing is questionably intact  Tongue was midline but I could not visualize her oropharynx or uvula  Head turning was spontaneous    On motor examination she is moving all extremities and withdrawing so strength is at least 4+    Sensory examination response to pain and withdraws    I could not get any reflexes  Coordination and gait could not be checked  Toe signs are mute  ________________________________________________________   RESULTS REVIEW:    VITAL SIGNS:   Temp:  [97.6 °F (36.4 °C)-99 °F (37.2 °C)] 98.3 °F (36.8 °C)  Heart Rate:  [60-74] 71  Resp:  [13-20] 13  BP: (111-183)/(54-99) 122/80     LABS:  WBC   Date Value Ref Range Status   08/04/2021 10.90 (H) 3.40 - 10.80 10*3/mm3 Final     RBC   Date Value Ref Range Status   08/04/2021 4.90 3.77 - 5.28 10*6/mm3 Final     Hemoglobin   Date Value Ref Range Status   08/04/2021 15.0 12.0 - 15.9 g/dL Final     Hematocrit   Date Value Ref Range Status   08/04/2021 45.0 34.0 - 46.6 % Final     MCV   Date Value Ref Range Status   08/04/2021 91.8 79.0 - 97.0 fL Final     MCH   Date Value Ref Range Status   08/04/2021 30.7 26.6 - 33.0 pg Final     MCHC   Date Value Ref Range Status   08/04/2021 33.4 31.5 - 35.7 g/dL Final     RDW   Date Value Ref Range Status   08/04/2021 16.7 (H) 12.3 - 15.4 % Final     RDW-SD   Date Value Ref Range Status   08/04/2021 52.5 37.0 - 54.0 fl Final     MPV   Date Value Ref Range Status   08/04/2021 8.6 6.0 - 12.0 fL Final     Platelets   Date Value Ref Range Status   08/04/2021 348 140 - 450 10*3/mm3 Final     Neutrophil %    Date Value Ref Range Status   08/03/2021 86.2 (H) 42.7 - 76.0 % Final     Lymphocyte %   Date Value Ref Range Status   08/03/2021 7.0 (L) 19.6 - 45.3 % Final     Monocyte %   Date Value Ref Range Status   08/03/2021 5.8 5.0 - 12.0 % Final     Eosinophil %   Date Value Ref Range Status   08/03/2021 0.2 (L) 0.3 - 6.2 % Final     Basophil %   Date Value Ref Range Status   08/03/2021 0.8 0.0 - 1.5 % Final     Neutrophils, Absolute   Date Value Ref Range Status   08/03/2021 11.60 (H) 1.70 - 7.00 10*3/mm3 Final     Lymphocytes, Absolute   Date Value Ref Range Status   08/03/2021 0.90 0.70 - 3.10 10*3/mm3 Final     Monocytes, Absolute   Date Value Ref Range Status   08/03/2021 0.80 0.10 - 0.90 10*3/mm3 Final     Eosinophils, Absolute   Date Value Ref Range Status   08/03/2021 0.00 0.00 - 0.40 10*3/mm3 Final     Basophils, Absolute   Date Value Ref Range Status   08/03/2021 0.10 0.00 - 0.20 10*3/mm3 Final     nRBC   Date Value Ref Range Status   08/03/2021 0.0 0.0 - 0.2 /100 WBC Final     Glucose   Date Value Ref Range Status   08/04/2021 108 (H) 65 - 99 mg/dL Final     BUN   Date Value Ref Range Status   08/04/2021 21 8 - 23 mg/dL Final     Creatinine   Date Value Ref Range Status   08/04/2021 1.28 (H) 0.57 - 1.00 mg/dL Final     Sodium   Date Value Ref Range Status   08/04/2021 134 (L) 136 - 145 mmol/L Final     Potassium   Date Value Ref Range Status   08/04/2021 3.9 3.5 - 5.2 mmol/L Final     Comment:     Slight hemolysis detected by analyzer. Results may be affected.     Chloride   Date Value Ref Range Status   08/04/2021 95 (L) 98 - 107 mmol/L Final     CO2   Date Value Ref Range Status   08/04/2021 28.0 22.0 - 29.0 mmol/L Final     Calcium   Date Value Ref Range Status   08/04/2021 9.2 8.2 - 9.6 mg/dL Final     Total Protein   Date Value Ref Range Status   08/03/2021 7.7 6.0 - 8.5 g/dL Final     Albumin   Date Value Ref Range Status   08/03/2021 3.80 3.50 - 5.20 g/dL Final     ALT (SGPT)   Date Value Ref Range  Status   08/03/2021 12 1 - 33 U/L Final     AST (SGOT)   Date Value Ref Range Status   08/03/2021 24 1 - 32 U/L Final     Alkaline Phosphatase   Date Value Ref Range Status   08/03/2021 86 39 - 117 U/L Final     Total Bilirubin   Date Value Ref Range Status   08/03/2021 0.9 0.0 - 1.2 mg/dL Final     eGFR Non  Amer   Date Value Ref Range Status   08/04/2021 39 (L) >60 mL/min/1.73 Final     BUN/Creatinine Ratio   Date Value Ref Range Status   08/04/2021 16.4 7.0 - 25.0 Final     Anion Gap   Date Value Ref Range Status   08/04/2021 11.0 5.0 - 15.0 mmol/L Final       Lab Results   Component Value Date    TSH 49.790 (H) 08/03/2021     (H) 08/03/2021    [id number] 08/03/2021         IMAGING STUDIES:  XR Chest 1 View    Result Date: 8/3/2021  No acute cardiopulmonary process identified.  Electronically Signed By-Lincoln Keller MD On:8/3/2021 1:50 PM This report was finalized on [id number] by  Lincoln Keller MD.    CT Head Without Contrast Stroke Protocol    Result Date: 8/3/2021  IMPRESSION :  1. This study is limited by motion artifact. 2. No acute findings. 3. Chronic ischemic changes. 4. Volume loss secondary to age-appropriate atrophy.  Electronically Signed By-Calvin Oro MD On:8/3/2021 2:39 PM This report was finalized on [id number] by  Calvin Oro MD.    XR Hips Bilateral With or Without Pelvis 5 View    Result Date: 8/3/2021  IMPRESSION : No acute fracture or traumatic malalignment identified.  Electronically Signed By-Lincoln Keller MD On:8/3/2021 1:51 PM This report was finalized on [id number] by  Lincoln Keller MD.      I reviewed the patient's new clinical results.      ________________________________________________________     PROBLEM LIST:    Altered mental status    Alzheimer's dementia (CMS/Formerly [facility])    HTN (hypertension)    Hypothyroidism    History of CVA (cerebrovascular accident)    Fall          Assessment/Plan   ASSESSMENT/PLAN:  Deconditioning and encephalopathy and  falls  Please refer to my discussion in HPI  She had some blood pressure changes that she may have hypertensive encephalopathy also    I talked to her daughter-in-law and I talked to the primary team  We discussed the utility of MRI CTA and other testing  They do not want any aggressive treatment or surgeries  She does not look like large stroke  She is not on aspirin so my recommendation would be to start her on aspirin and start her on Lipitor 40 mg  Continue supportive care  She is under hospice and they are thinking of putting her in assisted living facility and at present it does not look like that is going to be the choice because she is worse than that but I will let the family and the primary team decide    I am not seeing anything major like large stroke seizure or CNS infection but obviously those are possibilities and if the family wanted an MRI EEG and further testing may be considered but it looks like that is not where they want to go    Call me for any questions or issues and if further testing is needed  I have medical management per primary team like the thyroid and other issues as they may appear      I discussed the patient's findings and my recommendations with family, nursing staff and primary care team    Alphonse Mckee MD  08/04/21  12:03 EDT          Electronically signed by Alphonse Mckee MD at 08/04/21 7012

## 2021-08-04 NOTE — CONSULTS
"    Primary Care Provider: Provider, No Known     Consult requested by: Dr Paiz  Reason for Consultation: Neurological evaluation/family request for neurological deterioration  History taken from: chart family RN    Chief complaint: Worsening mentation and falls       SUBJECTIVE:    History of present illness:   The patient is a 89-year-old right-handed white female who is evaluated in room 270 at [facility]    Source information is medical records, my discussion with the admitting team and also with the patient's daughter-in-law Chio Davis over the phone    The patient was brought in because over the last few days she has been getting worse.  She has history of dementia but has been quite functional she lives on her own but very close to the patient's son and daughter-in-law.  Both of them would come and have breakfast with her and then they will have dinner with her on daily basis.  She has been diagnosed with dementia but was able to have conversation  She has prior \"mini strokes\"  He was brought in yesterday because she was found down by her daughter-in-law.  She was confused.  She may have fallen several times during the last week.  Unfortunately what has happened is that her son, the one taking care of her and seeing her multiple times a day, himself had a stroke and for the last 3 weeks has been in the hospital or [facility] and the daughter-in-law has been trying to take care of both of them  She is looking for placement because she cannot take care of her alone  She is a DO NOT RESUSCITATE and DO NOT INTUBATE  She was pleasantly confused but had some contraction but for me she did not do much    No history of seizures  Her blood pressure was significant elevated, we have several values in the 180s systolic and up to 99 diastolic  Labs were not really bad  Her TSH was elevated though  Her lipid profile is abnormal but when I say not bad I mean not explaining the present situation  He had what " "was reported as chronic small vessel changes and atrophy  Except for confusion she does not look focal    No witnessed seizures  No febrile episodes  Nothing suggesting meningitis    Cindy Davis is a 89 y.o. female with PMH of CVA, HTN, HLD, previous CVA, and dementia who is at patient of [facility] being treated 3 days a week. She has advanced dementia and has been falling frequently. She will have days where she is lucid and can fully care for herself and then other days where she becomes confused. She was found down at home by the daughter and appeared very confused this morning on 8/3/2021. She was last seen in her normal state of health around dinnertime last night. Hospice came to evaluate the patient and felt the patient needed to go to the ER for evaluation. The family and hospice have been working on getting the patient placed into a facility with 24 hour care. She is a DNR/DNI but not completely comfort care and the family would like further stroke workup.  They denied any recent illness of the patient such as fever, nausea, vomiting.      In the ED the patient is disoriented but will make random statements such as \"I can't get my arm throughout there.\" She cannot answer any questions or follow commands. She moves bilateral upper extremities spontaneously.  CT head showed no acute findings, limited study due to motion artifact, chronic ischemic changes.  CXR showed no acute cardiopulmonary process.  X-ray of bilateral hips showed no acute fracture or traumatic malalignment.  CK total was 391, creatinine 1.08, WBC 13.5.  Urinalysis showed no signs of infection.  Covid not detected. She was given 300mg rectal aspirin. She was admitted for further stroke workup and placement. Discussed case with case management and they will contact the patient's hospice company.      Review of Systems   Not possible because the patient is not communicating      PATIENT HISTORY:  Past Medical History: "   Diagnosis Date   • Alzheimer's dementia (CMS/Formerly [facility])    • Breast CA (CMS/Formerly [facility])    • HTN (hypertension)    • Hyperlipidemia    • Hypothyroidism    , History reviewed. No pertinent surgical history., History reviewed. No pertinent family history.,   Social History     Tobacco Use   • Smoking status: Never Smoker   Substance Use Topics   • Alcohol use: Not on file   • Drug use: Not on file   ,   Medications Prior to Admission   Medication Sig Dispense Refill Last Dose   • acetaminophen (TYLENOL) 500 MG tablet Take 1,000 mg by mouth Every 6 (Six) Hours As Needed for Mild Pain .      • donepezil (ARICEPT) 10 MG tablet Take 10 mg by mouth 2 (two) times a day.      • levothyroxine (SYNTHROID, LEVOTHROID) 125 MCG tablet Take 125 mcg by mouth Daily.      • Metoprolol-HCTZ ER 25-12.5 MG tablet sustained-release 24 hour Take 1 tablet by mouth 2 (two) times a day.      • omeprazole (priLOSEC) 40 MG capsule Take 40 mg by mouth Daily.      , Scheduled Meds:  aspirin, 81 mg, Oral, Daily  atorvastatin, 80 mg, Oral, Nightly  donepezil, 10 mg, Oral, BID  levothyroxine, 125 mcg, Oral, Daily  metoprolol tartrate, 25 mg, Oral, Q12H  pantoprazole, 40 mg, Oral, QAM  sodium chloride, 3 mL, Intravenous, Q12H    , Continuous Infusions:  sodium chloride, 75 mL/hr, Last Rate: 75 mL/hr (08/03/21 8506)    , PRN Meds:  •  acetaminophen **OR** acetaminophen  •  acetaminophen  •  bisacodyl  •  ondansetron  •  sodium chloride  •  sodium chloride, Allergies:  Patient has no known allergies.    ________________________________________________________        OBJECTIVE:  Upon today's exam, the patient is resting comfortably, she is mostly asleep, she just had PT OT and speech evaluation done so she was very tired  She does not look like she is in distress         Neurologic Exam  Limited neurological examination because the patient did not communicate with me  She did not follow commands  No naming and obviously no orientation questions could be  answered    On cranial examination she does respond to Casey the primary fields, pupils are sluggish about 2 mm, no ptosis, no nystagmus, no forceful eye deviation, funduscopic examination was not successful  I did not see any facial asymmetry  Hearing is questionably intact  Tongue was midline but I could not visualize her oropharynx or uvula  Head turning was spontaneous    On motor examination she is moving all extremities and withdrawing so strength is at least 4+    Sensory examination response to pain and withdraws    I could not get any reflexes  Coordination and gait could not be checked  Toe signs are mute  ________________________________________________________   RESULTS REVIEW:    VITAL SIGNS:   Temp:  [97.6 °F (36.4 °C)-99 °F (37.2 °C)] 98.3 °F (36.8 °C)  Heart Rate:  [60-74] 71  Resp:  [13-20] 13  BP: (111-183)/(54-99) 122/80     LABS:  WBC   Date Value Ref Range Status   08/04/2021 10.90 (H) 3.40 - 10.80 10*3/mm3 Final     RBC   Date Value Ref Range Status   08/04/2021 4.90 3.77 - 5.28 10*6/mm3 Final     Hemoglobin   Date Value Ref Range Status   08/04/2021 15.0 12.0 - 15.9 g/dL Final     Hematocrit   Date Value Ref Range Status   08/04/2021 45.0 34.0 - 46.6 % Final     MCV   Date Value Ref Range Status   08/04/2021 91.8 79.0 - 97.0 fL Final     MCH   Date Value Ref Range Status   08/04/2021 30.7 26.6 - 33.0 pg Final     MCHC   Date Value Ref Range Status   08/04/2021 33.4 31.5 - 35.7 g/dL Final     RDW   Date Value Ref Range Status   08/04/2021 16.7 (H) 12.3 - 15.4 % Final     RDW-SD   Date Value Ref Range Status   08/04/2021 52.5 37.0 - 54.0 fl Final     MPV   Date Value Ref Range Status   08/04/2021 8.6 6.0 - 12.0 fL Final     Platelets   Date Value Ref Range Status   08/04/2021 348 140 - 450 10*3/mm3 Final     Neutrophil %   Date Value Ref Range Status   08/03/2021 86.2 (H) 42.7 - 76.0 % Final     Lymphocyte %   Date Value Ref Range Status   08/03/2021 7.0 (L) 19.6 - 45.3 % Final     Monocyte %    Date Value Ref Range Status   08/03/2021 5.8 5.0 - 12.0 % Final     Eosinophil %   Date Value Ref Range Status   08/03/2021 0.2 (L) 0.3 - 6.2 % Final     Basophil %   Date Value Ref Range Status   08/03/2021 0.8 0.0 - 1.5 % Final     Neutrophils, Absolute   Date Value Ref Range Status   08/03/2021 11.60 (H) 1.70 - 7.00 10*3/mm3 Final     Lymphocytes, Absolute   Date Value Ref Range Status   08/03/2021 0.90 0.70 - 3.10 10*3/mm3 Final     Monocytes, Absolute   Date Value Ref Range Status   08/03/2021 0.80 0.10 - 0.90 10*3/mm3 Final     Eosinophils, Absolute   Date Value Ref Range Status   08/03/2021 0.00 0.00 - 0.40 10*3/mm3 Final     Basophils, Absolute   Date Value Ref Range Status   08/03/2021 0.10 0.00 - 0.20 10*3/mm3 Final     nRBC   Date Value Ref Range Status   08/03/2021 0.0 0.0 - 0.2 /100 WBC Final     Glucose   Date Value Ref Range Status   08/04/2021 108 (H) 65 - 99 mg/dL Final     BUN   Date Value Ref Range Status   08/04/2021 21 8 - 23 mg/dL Final     Creatinine   Date Value Ref Range Status   08/04/2021 1.28 (H) 0.57 - 1.00 mg/dL Final     Sodium   Date Value Ref Range Status   08/04/2021 134 (L) 136 - 145 mmol/L Final     Potassium   Date Value Ref Range Status   08/04/2021 3.9 3.5 - 5.2 mmol/L Final     Comment:     Slight hemolysis detected by analyzer. Results may be affected.     Chloride   Date Value Ref Range Status   08/04/2021 95 (L) 98 - 107 mmol/L Final     CO2   Date Value Ref Range Status   08/04/2021 28.0 22.0 - 29.0 mmol/L Final     Calcium   Date Value Ref Range Status   08/04/2021 9.2 8.2 - 9.6 mg/dL Final     Total Protein   Date Value Ref Range Status   08/03/2021 7.7 6.0 - 8.5 g/dL Final     Albumin   Date Value Ref Range Status   08/03/2021 3.80 3.50 - 5.20 g/dL Final     ALT (SGPT)   Date Value Ref Range Status   08/03/2021 12 1 - 33 U/L Final     AST (SGOT)   Date Value Ref Range Status   08/03/2021 24 1 - 32 U/L Final     Alkaline Phosphatase   Date Value Ref Range Status    08/03/2021 86 39 - 117 U/L Final     Total Bilirubin   Date Value Ref Range Status   08/03/2021 0.9 0.0 - 1.2 mg/dL Final     eGFR Non  Amer   Date Value Ref Range Status   08/04/2021 39 (L) >60 mL/min/1.73 Final     BUN/Creatinine Ratio   Date Value Ref Range Status   08/04/2021 16.4 7.0 - 25.0 Final     Anion Gap   Date Value Ref Range Status   08/04/2021 11.0 5.0 - 15.0 mmol/L Final       Lab Results   Component Value Date    TSH 49.790 (H) 08/03/2021     (H) 08/03/2021    [id number] 08/03/2021         IMAGING STUDIES:  XR Chest 1 View    Result Date: 8/3/2021  No acute cardiopulmonary process identified.  Electronically Signed By-Lincoln Keller MD On:8/3/2021 1:50 PM This report was finalized on [id number] by  Lincoln Keller MD.    CT Head Without Contrast Stroke Protocol    Result Date: 8/3/2021  IMPRESSION :  1. This study is limited by motion artifact. 2. No acute findings. 3. Chronic ischemic changes. 4. Volume loss secondary to age-appropriate atrophy.  Electronically Signed By-Calvin Oro MD On:8/3/2021 2:39 PM This report was finalized on [id number] by  Calvin Oro MD.    XR Hips Bilateral With or Without Pelvis 5 View    Result Date: 8/3/2021  IMPRESSION : No acute fracture or traumatic malalignment identified.  Electronically Signed By-Lincoln Keller MD On:8/3/2021 1:51 PM This report was finalized on [id number] by  Lincoln Keller MD.      I reviewed the patient's new clinical results.      ________________________________________________________     PROBLEM LIST:    Altered mental status    Alzheimer's dementia (CMS/HCC)    HTN (hypertension)    Hypothyroidism    History of CVA (cerebrovascular accident)    Fall          Assessment/Plan   ASSESSMENT/PLAN:  Deconditioning and encephalopathy and falls  Please refer to my discussion in HPI  She had some blood pressure changes that she may have hypertensive encephalopathy also    I talked to her daughter-in-law and I  talked to the primary team  We discussed the utility of MRI CTA and other testing  They do not want any aggressive treatment or surgeries  She does not look like large stroke  She is not on aspirin so my recommendation would be to start her on aspirin and start her on Lipitor 40 mg  Continue supportive care  She is under hospice and they are thinking of putting her in assisted living facility and at present it does not look like that is going to be the choice because she is worse than that but I will let the family and the primary team decide    I am not seeing anything major like large stroke seizure or CNS infection but obviously those are possibilities and if the family wanted an MRI EEG and further testing may be considered but it looks like that is not where they want to go    Call me for any questions or issues and if further testing is needed  I have medical management per primary team like the thyroid and other issues as they may appear      I discussed the patient's findings and my recommendations with family, nursing staff and primary care team    Alphonse Mckee MD  08/04/21  12:03 EDT

## 2021-08-04 NOTE — PLAN OF CARE
Goal Outcome Evaluation:     Pt was seen for clinical swallow eval on this date. Oral motor assessment attempted however the patient was unable to complete tasks. Upon entrance to the patient's room she had her blankets and clothes off. Pt repositioned and was seated upright in bed at 90 degrees. Assessed trials of ice chips by spoon x 2 and water by spoon x 2. Attempted applesauce trials multiple times, but pt refused, verbally, kept mouth closed and began to push clinician's hand away. Pt exhibited adequate labial seal with no anterior spillage on ice chips and water. Once initiated, swallow was timely via digital palpation. Pt coughed immediately after second trial of water by spoon, with continued coughing/choking following. Unable to assess swallow further due to patient refusal.     RECOMMENDATIONS: ST unable to recommend a po diet at this time based on above results and refusal to take further po trials. It is rec pt remain NPO, but allowed ice chips after oral care if requested. It is also rec pt be re-evaluated clinically and be evaluated via VFSS when/if appropriate. ST will continue to follow and make further recs as needed. Discussed with nurse who was in agreement with the above.

## 2021-08-04 NOTE — THERAPY EVALUATION
Patient Name: Cindy Davis  : 1929    MRN: [id number]                              Today's Date: 2021       Admit Date: 8/3/2021    Visit Dx:     ICD-10-CM ICD-9-CM   1. Cerebrovascular accident (CVA), unspecified mechanism (CMS/HCC)  I63.9 434.91     Patient Active Problem List   Diagnosis   • Altered mental status   • Alzheimer's dementia (CMS/HCC)   • HTN (hypertension)   • Hypothyroidism   • History of CVA (cerebrovascular accident)   • Fall     Past Medical History:   Diagnosis Date   • Alzheimer's dementia (CMS/HCC)    • Breast CA (CMS/HCC)    • HTN (hypertension)    • Hyperlipidemia    • Hypothyroidism      History reviewed. No pertinent surgical history.  General Information     Row Name 21 1201          General Information    Prior Level of Function  independent:;all household mobility;ADL's Family report to ER staff that pt has variable cognition & function. Has Hospice services at home 3X/week.  -     Existing Precautions/Restrictions  fall  -     Barriers to Rehab  cognitive status;medically complex;previous functional deficit  -     Row Name 21 1201          Living Environment    Lives With  alone  -     Row Name 21 1201          Cognition    Orientation Status (Cognition)  disoriented to;person;place;situation;time  -     Row Name 21 1201          Safety Issues, Functional Mobility    Safety Issues Affecting Function (Mobility)  ability to follow commands;at risk behavior observed;awareness of need for assistance;insight into deficits/self-awareness;judgment;problem-solving;safety precaution awareness;impulsivity;sequencing abilities  -     Impairments Affecting Function (Mobility)  cognition;pain;range of motion (ROM)  -       User Key  (r) = Recorded By, (t) = Taken By, (c) = Cosigned By    Initials Name Provider Type     Angeles Murphy OT Occupational Therapist          Mobility/ADL's     Row Name 21 1228          Bed Mobility    Bed  Mobility  rolling left  -     Rolling Left Luna (Bed Mobility)  dependent (less than 25% patient effort)  -     Comment (Bed Mobility)  pt lies in positoin of comfort in Rt side-lying fetal position & resists being turned. Fearful w/ mvmt & ROM. Refuses to sit up, asking only to be left alone.  -     Row Name 08/04/21 1228          Activities of Daily Living    BADL Assessment/Intervention  toileting;grooming;lower body dressing  -[facility] Name 08/04/21 1228          Toileting Assessment/Training    Luna Level (Toileting)  toileting skills;dependent (less than 25% patient effort)  -     Comment (Toileting)  unable to access [facility]. skin breakdown is severe.  -     Row Name 08/04/21 1228          Grooming Assessment/Training    Luna Level (Grooming)  hair care, combing/brushing;oral care regimen;wash face, hands;maximum assist (25% patient effort)  -     Position (Grooming)  supported sitting  -[facility] Name 08/04/21 1228          Lower Body Dressing Assessment/Training    Luna Level (Lower Body Dressing)  lower body dressing skills;dependent (less than 25% patient effort)  -     Position (Lower Body Dressing)  edge of bed sitting;supine  -       User Key  (r) = Recorded By, (t) = Taken By, (c) = Cosigned By    Initials Name Provider Type     Angeles Murphy, OT Occupational Therapist        Obj/Interventions     Row Name 08/04/21 1241          Range of Motion Comprehensive    Comment, General Range of Motion  Lt shld 50% limit due to pain & RUE 25% limit due to pain  -[facility] Name 08/04/21 1241          Strength Comprehensive (MMT)    Comment, General Manual Muscle Testing (MMT) Assessment  BUE grossly 3+/5 within limited range. Difficult to assess due to lack of command-following  -     Row Name 08/04/21 1241          Balance    Balance Assessment  sitting static balance  -     Static Sitting Balance  not tested refused to sit up  -       User Key  (r) =  Recorded By, (t) = Taken By, (c) = Cosigned By    Initials Name Provider Type     Angeles Murphy, OT Occupational Therapist        Goals/Plan     [facility] Name 08/04/21 1253          Bed Mobility Goal 1 (OT)    Activity/Assistive Device (Bed Mobility Goal 1, OT)  bed mobility activities, all  -     Slope Level/Cues Needed (Bed Mobility Goal 1, OT)  standby assist;tactile cues required;verbal cues required  -     Time Frame (Bed Mobility Goal 1, OT)  2 weeks  -MH     Row Name 08/04/21 1253          Transfer Goal 1 (OT)    Activity/Assistive Device (Transfer Goal 1, OT)  sit-to-stand/stand-to-sit;bed-to-chair/chair-to-bed;commode, bedside without drop arms  -     Slope Level/Cues Needed (Transfer Goal 1, OT)  minimum assist (75% or more patient effort)  -     Time Frame (Transfer Goal 1, OT)  2 weeks  -MH     Row Name 08/04/21 1253          Grooming Goal 1 (OT)    Activity/Device (Grooming Goal 1, OT)  oral care;wash face, hands;hair care  -     Slope (Grooming Goal 1, OT)  moderate assist (50-74% patient effort)  -     Time Frame (Grooming Goal 1, OT)  2 weeks  -MH     Row Name 08/04/21 1253          Therapy Assessment/Plan (OT)    Planned Therapy Interventions (OT)  activity tolerance training;functional balance retraining;cognitive/visual perception retraining;patient/caregiver education/training;transfer/mobility retraining;ROM/therapeutic exercise  -       User Key  (r) = Recorded By, (t) = Taken By, (c) = Cosigned By    Initials Name Provider Type     Angeles Murphy OT Occupational Therapist        Clinical Impression     Row Name 08/04/21 1246          Pain Scale: FACES Pre/Post-Treatment    Pain: FACES Scale, Pretreatment  2-->hurts little bit back  -     Posttreatment Pain Rating  4-->hurts little more all over  -     Pre/Posttreatment Pain Comment  did not like legs being moved to the left  -MH     Row Name 08/04/21 1246          Plan of Care Review    Plan of Care  Reviewed With  patient  -     Progress  no change  -     Outcome Summary  Pt is 89 y/o F with dementia who lives at home, attended by family or hospice agent. She has variable function & was found down prior to admission. She is not following commands & OT was not able to get her to state any information about herself, including her name or birthdate. Pt is painful w/ attempts at PROM, especially to being repositioned on her Lt side in the bed. She refuses to sit up and begs to be left alone. OT will see pt 1-3X/week as family are not yet placing pt on comfort measures and she has some functionality at home. Pt needs increased level of care at d/c. Recommend ECF placement. OT will allow pt to accept OT or refuse it as she wishes when it is offered, and will monitor for orders of comfort measures at which time OT would complete orders.  -     Row Name 08/04/21 1246          Therapy Assessment/Plan (OT)    Rehab Potential (OT)  fair, will monitor progress closely  -     Criteria for Skilled Therapeutic Interventions Met (OT)  yes  -     Therapy Frequency (OT)  2 times/wk  -     Predicted Duration of Therapy Intervention (OT)  until D/C  -     Row Name 08/04/21 1246          Therapy Plan Review/Discharge Plan (OT)    Anticipated Discharge Disposition (OT)  extended care facility  -     Row Name 08/04/21 1246          Vital Signs    Pre Patient Position  Supine  -     Intra Patient Position  Supine  -     Post Patient Position  Supine  -     Row Name 08/04/21 1246          Positioning and Restraints    Pre-Treatment Position  in bed  -     Post Treatment Position  bed  -     In Bed  notified nsg;supine;call light within reach;encouraged to call for assist;exit alarm on  -       User Key  (r) = Recorded By, (t) = Taken By, (c) = Cosigned By    Initials Name Provider Type    Angeles Avila, OT Occupational Therapist        Outcome Measures     Row Name 08/04/21 1253 08/04/21 1134        Modified [place] Scale    Pre-Stroke Modified [place] Scale  3 - Moderate disability.  Requiring some help, but able to walk without assistance.  -MH  --    Modified Robert Scale  5 - Severe disability.  Bedridden, incontinent, and requiring constant nursing care and attention.  -MH  5 - Severe disability.  Bedridden, incontinent, and requiring constant nursing care and attention.  -CR    Row Name 08/04/21 1253 08/04/21 1137       Functional Assessment    Outcome Measure Options  Modified [place]  -MH  Modified Robert  -CR      User Key  (r) = Recorded By, (t) = Taken By, (c) = Cosigned By    Initials Name Provider Type     Angeles Murphy, OT Occupational Therapist    CR Reyes, Carmela, PT Physical Therapist          Occupational Therapy Education                 Title: PT OT SLP Therapies (In Progress)     Topic: Occupational Therapy (In Progress)     Point: ADL training (Not Started)     Description:   Instruct learner(s) on proper safety adaptation and remediation techniques during self care or transfers.   Instruct in proper use of assistive devices.              Learner Progress:  Not documented in this visit.          Point: Home exercise program (Not Started)     Description:   Instruct learner(s) on appropriate technique for monitoring, assisting and/or progressing therapeutic exercises/activities.              Learner Progress:  Not documented in this visit.          Point: Precautions (In Progress)     Description:   Instruct learner(s) on prescribed precautions during self-care and functional transfers.              Learning Progress Summary           Patient Nonacceptance, E, NL,NR by  at 8/4/2021 1255                   Point: Body mechanics (In Progress)     Description:   Instruct learner(s) on proper positioning and spine alignment during self-care, functional mobility activities and/or exercises.              Learning Progress Summary           Patient Nonacceptance, E, NL,NR by  at 8/4/2021 1255                                User Key     Initials Effective Dates Name Provider Type Discipline     06/16/21 -  Angeles Murphy OT Occupational Therapist OT              OT Recommendation and Plan  Planned Therapy Interventions (OT): activity tolerance training, functional balance retraining, cognitive/visual perception retraining, patient/caregiver education/training, transfer/mobility retraining, ROM/therapeutic exercise  Therapy Frequency (OT): 2 times/wk  Plan of Care Review  Plan of Care Reviewed With: patient  Progress: no change  Outcome Summary: Pt is 89 y/o F with dementia who lives at home, attended by family or hospice agent. She has variable function & was found down prior to admission. She is not following commands & OT was not able to get her to state any information about herself, including her name or birthdate. Pt is painful w/ attempts at PROM, especially to being repositioned on her Lt side in the bed. She refuses to sit up and begs to be left alone. OT will see pt 1-3X/week as family are not yet placing pt on comfort measures and she has some functionality at home. Pt needs increased level of care at d/c. Recommend ECF placement. OT will allow pt to accept OT or refuse it as she wishes when it is offered, and will monitor for orders of comfort measures at which time OT would complete orders.     Time Calculation:   Time Calculation- OT     Row Name 08/04/21 1256             Time Calculation-     OT Start Time  1045  -      OT Stop Time  1100  -      OT Time Calculation (min)  15 min  -      Total Timed Code Minutes- OT  0 minute(s)  -      OT Received On  08/04/21  -      OT - Next Appointment  08/06/21  -      OT Goal Re-Cert Due Date  08/18/21  -        User Key  (r) = Recorded By, (t) = Taken By, (c) = Cosigned By    Initials Name Provider Type     Angeles Murphy OT Occupational Therapist        Therapy Charges for Today     Code Description Service Date Service Provider  Modifiers Qty    84903051101 HC OT EVAL LOW COMPLEXITY 3 8/4/2021 Angeles Murphy OT GO 1               Angeles Murphy OT  8/4/2021

## 2021-08-04 NOTE — PLAN OF CARE
Goal Outcome Evaluation:  Problem: Adult Inpatient Plan of Care  Goal: Plan of Care Review  Outcome: Ongoing, Progressing  Goal: Patient-Specific Goal (Individualized)  Outcome: Ongoing, Progressing  Goal: Absence of Hospital-Acquired Illness or Injury  Outcome: Ongoing, Progressing  Goal: Optimal Comfort and Wellbeing  Outcome: Ongoing, Progressing  Goal: Readiness for Transition of Care  Outcome: Ongoing, Progressing     Problem: Fall Injury Risk  Goal: Absence of Fall and Fall-Related Injury  Outcome: Ongoing, Progressing     Problem: Skin Injury Risk Increased  Goal: Skin Health and Integrity  Outcome: Ongoing, Progressing

## 2021-08-04 NOTE — PROGRESS NOTES
[facility] Medicine Services Daily Progress Note    Patient Name: Cindy Davis  : 1929  MRN: [id number]  Primary Care Physician:  Provider, No Known  Date of admission: 8/3/2021      Subjective      Chief Complaint: found down      Date    2021    Patient non-verbal, responds to basic commands.  Case discussed with neuro, suspect decompensation 2/2 dementia and doubt stroke.  Will hold MRI at this point.  Case management is working with family to transfer to hospice facility.    ROS   Unable to obtain due to dementia    Objective      Vitals:   Temp:  [97.6 °F (36.4 °C)-99 °F (37.2 °C)] 98.3 °F (36.8 °C)  Heart Rate:  [60-74] 71  Resp:  [13-20] 13  BP: (111-183)/(54-99) 122/80    Physical Exam  Constitutional:       General: She is not in acute distress.     Appearance: She is not ill-appearing.      Comments: AAOx0  Minimal response to commands  Responds to sternal rub   HENT:      Head: Normocephalic and atraumatic.      Nose: No congestion.      Mouth/Throat:      Mouth: Mucous membranes are dry.      Pharynx: Oropharynx is clear.   Eyes:      General: No scleral icterus.     Extraocular Movements: Extraocular movements intact.   Cardiovascular:      Rate and Rhythm: Normal rate and regular rhythm.      Heart sounds: No murmur heard.   No friction rub. No gallop.    Pulmonary:      Effort: No respiratory distress.      Breath sounds: No wheezing.   Abdominal:      General: There is no distension.      Tenderness: There is no abdominal tenderness. There is no guarding.   Musculoskeletal:         General: No swelling or deformity.      Cervical back: Normal range of motion.      Right lower leg: No edema.      Left lower leg: No edema.   Skin:     Coloration: Skin is not jaundiced.      Findings: No bruising or lesion.   Neurological:      Mental Status: She is disoriented.      Comments: AAOx0  Minimal responsiveness to commands  Responds to sternal rub            Result  Review    Result Review:  I have personally reviewed the results from the time of this admission to 8/4/2021 13:39 EDT and agree with these findings:  [x]  Laboratory  []  Microbiology  [x]  Radiology  []  EKG/Telemetry   []  Cardiology/Vascular   []  Pathology  []  Old records  []  Other:            Assessment/Plan      Brief Patient Summary:  Cindy Davis is a 89 y.o. female who with PMH of CVA, HTN, HLD, previous CVA, and dementia who is at patient of [facility] being treated 3 days a week 2/2 advanced demantia who presented to [facility] ED as she was found down.  OF note, family admits to multiple falls over the past several weeks.  Due to concern for stroke, family was adament she be admitted for cva workup with hospice suspended while admitted.  Neuro spoke with family, no further imaging as stroke unlikely.  Patient minimally responsive and did not pass swallow eval.  Family is currently arranging hospice care with social and case mgt.      aspirin, 81 mg, Oral, Daily  atorvastatin, 80 mg, Oral, Nightly  donepezil, 10 mg, Oral, BID  levothyroxine, 125 mcg, Oral, Daily  metoprolol tartrate, 25 mg, Oral, Q12H  pantoprazole, 40 mg, Oral, QAM  sodium chloride, 3 mL, Intravenous, Q12H       sodium chloride, 75 mL/hr, Last Rate: 75 mL/hr (08/03/21 2316)         Active Hospital Problems:  Active Hospital Problems    Diagnosis    • **Altered mental status    • Alzheimer's dementia (CMS/[facility])    • HTN (hypertension)    • Hypothyroidism    • History of CVA (cerebrovascular accident)    • Fall      Plan:     Altered mental status r/o CVA  Fall     -pt found down and confused morning of 8/3/21 and now remains confused and poorly communicative, no obvious lateralizing deficits    -CT head: no acute findings, limited study due to motion artifact, chronic ischemic changes.     -WBC elevated 13.5 > 10.9    -UA without signs of infection    -CK total 391, low rate IVFs     -  Start daily aspirin and statin    -Neuro  discussed case with family, will defer MRI at this time as patient will most likely not tolerate and will not change course as stroke unlikely    -neurology consulted for further recommendations    -DNR/DNI, hospice patient and will consult. Pt will need placement to facility, case management aware.     -case mgt working with facility for placement       #Dementia    -cont home aricept    - suspect worsened by uncontrolled BP and non-compliance with home synthroid     #Hypothyroidism    - TSH 49.79    - check FT4 and FT3    - suspect patient has been non compliant with home synthroid    - currently NPO    - start IV levothyroxine 100mcg daily until FT3 and FT4 return     #H/o CVA    - start daily aspirin and statin     #Hypertension urgency - resolved    - SBP > 180 on admission    - possible element of HTN encephalopathy    -cont home bb    -labetalol PRN with parameters    DVT prophylaxis:  Mechanical DVT prophylaxis orders are present.    CODE STATUS:    Limited Support to NOT Include: Intubation  Code Status: CPR  Medical Interventions (Level of Support Prior to Arrest): Limited      Disposition:  I expect patient to be discharged in 1-2 days     This patient has been examined wearing appropriate Personal Protective Equipment and discussed with PAtient. 08/04/21      Electronically signed by Dariela Paiz DO, 08/04/21, 13:39 EDT.  [facility] Hospitalist Team

## 2021-08-04 NOTE — CASE MANAGEMENT/SOCIAL WORK
Continued Stay Note  [facility]     Patient Name: Cindy Davis  MRN: [id number]  Today's Date: 8/4/2021    Admit Date: 8/3/2021    Discharge Plan     Row Name 08/04/21 0930       Plan    Plan  DC Plan: Seeking placement with [facility] - hospice services revoked for duration of hospital stay. See notes for details.    Plan Comments  SW received call from [facility] liaison (Luciana), who expressed conflicting information present regarding pt/family previously expressed goals for hospice destination ([facility]) vs current goals for hospice destination ([place]). SW called pt's dtr, Chio, for clarification. Per dtr, hope is for pt to be able to transition to [facility]. Per pt's dtr, dtr has already been in contact with [facility] prior to pt's admission to hospital and [facility] would need to come to hospital to complete bedside evaluation to determine most appropriate LOC. GIOVANA obtained name of [facility] contact (Gita: [phone]). SW verified pt's dtr is of understanding that [facility] would be private pay. Pt's dtr stated that she is aware that [facility] would be private pay - inquired if it would be possible for pt to go to [facility] for 5-day respite stay while family arranges finances and SW informed dtr that she would communicate this inquiry with [facility] liaison (Luciana). GIOVANA called [facility] and s/w Gita - obtained fax number for clinicals ([phone]) and sent clinicals to [facility] via ad hoc fax (attn: Gita). GIOVANA updated [facility] liaison (Luciana) and informed of family inquiry regarding 5-day respite with planned transition into permanent residency at [facility].        Phone communication or documentation only - no physical contact with patient or family.    Manda Farris, MICHAELW, LSW    Office: [phone]  Cell: [phone]  Fax: [phone]  E-mail: [email or web address]

## 2021-08-05 LAB
ANION GAP SERPL CALCULATED.3IONS-SCNC: 10 MMOL/L (ref 5–15)
BUN SERPL-MCNC: 24 MG/DL (ref 8–23)
BUN/CREAT SERPL: 22.4 (ref 7–25)
CALCIUM SPEC-SCNC: 8.8 MG/DL (ref 8.2–9.6)
CHLORIDE SERPL-SCNC: 96 MMOL/L (ref 98–107)
CO2 SERPL-SCNC: 26 MMOL/L (ref 22–29)
CORTIS SERPL-MCNC: 18.68 MCG/DL
CREAT SERPL-MCNC: 1.07 MG/DL (ref 0.57–1)
DEPRECATED RDW RBC AUTO: 51.6 FL (ref 37–54)
ERYTHROCYTE [DISTWIDTH] IN BLOOD BY AUTOMATED COUNT: 16.4 % (ref 12.3–15.4)
GFR SERPL CREATININE-BSD FRML MDRD: 48 ML/MIN/1.73
GLUCOSE SERPL-MCNC: 94 MG/DL (ref 65–99)
HCT VFR BLD AUTO: 44.3 % (ref 34–46.6)
HGB BLD-MCNC: 15 G/DL (ref 12–15.9)
MCH RBC QN AUTO: 30.7 PG (ref 26.6–33)
MCHC RBC AUTO-ENTMCNC: 33.9 G/DL (ref 31.5–35.7)
MCV RBC AUTO: 90.5 FL (ref 79–97)
PLATELET # BLD AUTO: 319 10*3/MM3 (ref 140–450)
PMV BLD AUTO: 8.4 FL (ref 6–12)
POTASSIUM SERPL-SCNC: 4.4 MMOL/L (ref 3.5–5.2)
QT INTERVAL: 676 MS
RBC # BLD AUTO: 4.89 10*6/MM3 (ref 3.77–5.28)
SODIUM SERPL-SCNC: 132 MMOL/L (ref 136–145)
WBC # BLD AUTO: 11.8 10*3/MM3 (ref 3.4–10.8)

## 2021-08-05 PROCEDURE — 80048 BASIC METABOLIC PNL TOTAL CA: CPT | Performed by: STUDENT IN AN ORGANIZED HEALTH CARE EDUCATION/TRAINING PROGRAM

## 2021-08-05 PROCEDURE — 99232 SBSQ HOSP IP/OBS MODERATE 35: CPT | Performed by: STUDENT IN AN ORGANIZED HEALTH CARE EDUCATION/TRAINING PROGRAM

## 2021-08-05 PROCEDURE — 85027 COMPLETE CBC AUTOMATED: CPT | Performed by: STUDENT IN AN ORGANIZED HEALTH CARE EDUCATION/TRAINING PROGRAM

## 2021-08-05 PROCEDURE — 82533 TOTAL CORTISOL: CPT | Performed by: STUDENT IN AN ORGANIZED HEALTH CARE EDUCATION/TRAINING PROGRAM

## 2021-08-05 RX ORDER — DEXTROSE AND SODIUM CHLORIDE 5; .9 G/100ML; G/100ML
75 INJECTION, SOLUTION INTRAVENOUS CONTINUOUS
Status: DISCONTINUED | OUTPATIENT
Start: 2021-08-05 | End: 2021-08-05

## 2021-08-05 RX ADMIN — LEVOTHYROXINE SODIUM 100 MCG: 20 INJECTION, SOLUTION INTRAVENOUS at 06:13

## 2021-08-05 RX ADMIN — Medication 3 ML: at 08:39

## 2021-08-05 RX ADMIN — Medication 3 ML: at 20:33

## 2021-08-05 RX ADMIN — ASPIRIN 300 MG: 300 SUPPOSITORY RECTAL at 09:45

## 2021-08-05 RX ADMIN — PANTOPRAZOLE SODIUM 40 MG: 40 INJECTION, POWDER, FOR SOLUTION INTRAVENOUS at 06:13

## 2021-08-05 RX ADMIN — DEXTROSE AND SODIUM CHLORIDE 75 ML/HR: 5; 900 INJECTION, SOLUTION INTRAVENOUS at 08:38

## 2021-08-05 NOTE — PLAN OF CARE
Problem: Adult Inpatient Plan of Care  Goal: Plan of Care Review  Outcome: Ongoing, Progressing  Goal: Patient-Specific Goal (Individualized)  Outcome: Ongoing, Progressing  Goal: Absence of Hospital-Acquired Illness or Injury  Outcome: Ongoing, Progressing  Intervention: Identify and Manage Fall Risk  Recent Flowsheet Documentation  Taken 8/5/2021 0959 by Neva Trevino RN  Safety Promotion/Fall Prevention: safety round/check completed  Taken 8/5/2021 0841 by Neva Trevino RN  Safety Promotion/Fall Prevention:   safety round/check completed   toileting scheduled   clutter free environment maintained   assistive device/personal items within reach  Intervention: Prevent Skin Injury  Recent Flowsheet Documentation  Taken 8/5/2021 0959 by Neva Trevino RN  Body Position:   turned   side-lying, left  Intervention: Prevent Infection  Recent Flowsheet Documentation  Taken 8/5/2021 0841 by Neva Trevino RN  Infection Prevention:   single patient room provided   equipment surfaces disinfected   environmental surveillance performed  Goal: Optimal Comfort and Wellbeing  Outcome: Ongoing, Progressing  Goal: Readiness for Transition of Care  Outcome: Ongoing, Progressing   Goal Outcome Evaluation:  patient is more awake this morning but does not follow commands.

## 2021-08-05 NOTE — CASE MANAGEMENT/SOCIAL WORK
Continued Stay Note   [place]     Patient Name: Cindy Davis  MRN: [id number]  Today's Date: 8/5/2021    Admit Date: 8/3/2021    Discharge Plan     Row Name 08/05/21 1033       Plan    Plan  DC Plan: [facility] with [facility]. PASRR is approved. No pre-cert is needed. Pharmacy updated in Epic to [facility].    Plan Comments  SW received text message from Luciana with [facility] that pt's dtr expressed interest in [facility]. SW sent text to [facility] liaison (Kirstin) inquiring about availability of beds on this date - per [facility] liaison, no beds available until at least the end of next week. SW updated [facility] liaison via text. SW called pt's dtr and had a lengthy discussion with pt regarding alternative placement - pt's dtr inquired again about [facility] placement and SW reminded dtr that [facility] cannot work with [facility] so other hospice would have to be pursued if pt's dtr would like for pt to potentially go to [facility]; pt's dtr expressed that she would like to keep [facility] and provided consent to SW to pursue [facility]. Pt's dtr expressed feeling overwhelmed and difficulty that she has experienced with processing information presented at the hospital d/t feeling overwhelmed with pt's hospitalization as well as her 's recent stroke and feeling the need to make extended family happy. SW provided emotional support to pt's family. SW answered all questions asked by pt's dtr to the satisfaction of pt's dtr. SW reviewed d/c plan with pt's dtr multiple times to ensure understanding and agreeableness to plan of care. Pt's dtr expressed agreeableness to transition to [facility] with [facility] on 8/5 - however, expressed that she would like to s/w MD prior to pt's d/c regarding ultrasound of pt's heart, which was done on 8/4, as well as possibly d/c'ing fluids in accordance with pt's Living Will. GIOVANA advised pt that she  would (1) text contact information for [facility] to pt's dtr, (2) communicate with [facility] Coffey regarding need to contact dtr about money needed up front, (3) discuss plan of care with [facility] liaison, and (4) update MD regarding plan of care and request of pt's dtr to talk to MD prior to d/c. GIOVANA texted contact information for [facility] to pt's dtr. GIOVANA texted liaisons for [facility] and [facility] to update on plan of care and family needs. GIOVANA sent secure chat to MD, RN, and CM to update on plan of care and to request MD s/w pt's dtr when she arrives to the hospital.        Phone communication or documentation only - no physical contact with patient or family.    ANN Mcpherson, [facility]    Office: [phone]  Cell: [phone]  Fax: [phone]  E-mail: [email or web address]

## 2021-08-05 NOTE — CASE MANAGEMENT/SOCIAL WORK
Continued Stay Note   [place]     Patient Name: Cindy Davis  MRN: [id number]  Today's Date: 8/5/2021    Admit Date: 8/3/2021    Discharge Plan     Row Name 08/05/21 1723       Plan    Plan  DC Plan: [facility] with [facility] on 8/6 as appropriate. PASRR is approved. No pre-cert is needed. Pharmacy updated in [facility] to [facility].    Plan Comments  SW received phone call from MD that following discussion with pt's dtr, it was decided that pt will remain in the hospital for one more night with plan to d/c to [facility] with [facility] on 8/6 as appropriate. SW updated liaisons for [facility] and [facility].        Phone communication or documentation only - no physical contact with patient or family.    ANN Mcpherson, LSW    Office: [phone]  Cell: [phone]  Fax: [phone]  E-mail: [email or web address]

## 2021-08-05 NOTE — PLAN OF CARE
Case discussed extensively with daughter at bedside.  Plan is to discharge patient to [facility] with hospice tomorrow.  Until then, patient is to remain DNR/DNI with no enteral nutrition or fluids.  Daughter is aware of patient's poor condition and potential for rapid decline including her bradycardia, no further intervention desired.  Care to be downgraded to [facility].      Electronically signed by Dariela Paiz DO, 08/05/21, 4:02 PM EDT.

## 2021-08-05 NOTE — PLAN OF CARE
"Attempted to see pt for swallow eval this AM. Pt refused and stated  \"let me rest.\" ST will continue to follow.   "

## 2021-08-05 NOTE — PROGRESS NOTES
[facility] Medicine Services Daily Progress Note    Patient Name: Cindy Davis  : 1929  MRN: [id number]  Primary Care Physician:  Provider, No Known  Date of admission: 8/3/2021      Subjective      Chief Complaint: found down      Date    2021    Patient non-verbal, responds to basic commands.  Case discussed with neuro, suspect decompensation 2/2 dementia and doubt stroke.  Will hold MRI at this point.  Case management is working with family to transfer to hospice facility.    2021    Patient AAOx1-2 this morning with poor insight.  Remains bed bound and NPO.  SW working on placement with hospice.  Plan family meeting today.    ROS   Unable to obtain due to dementia    Objective      Vitals:   Temp:  [98.5 °F (36.9 °C)-98.9 °F (37.2 °C)] 98.6 °F (37 °C)  Heart Rate:  [62-82] 62  Resp:  [11-21] 16  BP: (147-163)/(69-98) 147/94    Physical Exam  Constitutional:       General: She is not in acute distress.     Appearance: She is not ill-appearing.      Comments: AAOx1-2, very poor insight  Minimal response to commands  Responds to sternal rub   HENT:      Head: Normocephalic and atraumatic.      Nose: No congestion.      Mouth/Throat:      Mouth: Mucous membranes are dry.      Pharynx: Oropharynx is clear.   Eyes:      General: No scleral icterus.     Extraocular Movements: Extraocular movements intact.   Cardiovascular:      Rate and Rhythm: Normal rate and regular rhythm.      Heart sounds: No murmur heard.   No friction rub. No gallop.    Pulmonary:      Effort: No respiratory distress.      Breath sounds: No wheezing.   Abdominal:      General: There is no distension.      Tenderness: There is no abdominal tenderness. There is no guarding.   Musculoskeletal:         General: No swelling or deformity.      Cervical back: Normal range of motion.      Right lower leg: No edema.      Left lower leg: No edema.   Skin:     Coloration: Skin is not jaundiced.      Findings: No  bruising or lesion.   Neurological:      Mental Status: She is disoriented.      Comments: AAOx1-2, poor insight  Minimal responsiveness to commands              Result Review    Result Review:  I have personally reviewed the results from the time of this admission to 8/5/2021 13:16 EDT and agree with these findings:  [x]  Laboratory  []  Microbiology  [x]  Radiology  []  EKG/Telemetry   []  Cardiology/Vascular   []  Pathology  []  Old records  []  Other:            Assessment/Plan      Brief Patient Summary:  Cindy Davis is a 89 y.o. female who with PMH of CVA, HTN, HLD, previous CVA, and dementia who is at patient of [facility] being treated 3 days a week 2/2 advanced demantia who presented to [facility] ED as she was found down.  OF note, family admits to multiple falls over the past several weeks.  Due to concern for stroke, family was adament she be admitted for cva workup with hospice suspended while admitted.  Neuro spoke with family, no further imaging as stroke unlikely.  Patient minimally responsive and did not pass swallow eval.  Family is currently arranging hospice care with social and case mgt.      aspirin, 300 mg, Rectal, Daily  atorvastatin, 80 mg, Oral, Nightly  donepezil, 10 mg, Oral, BID  Levothyroxine Sodium, 100 mcg, Intravenous, Q AM  metoprolol tartrate, 25 mg, Oral, Q12H  pantoprazole, 40 mg, Intravenous, Q AM  sodium chloride, 3 mL, Intravenous, Q12H       dextrose 5 % and sodium chloride 0.9 %, 75 mL/hr, Last Rate: 75 mL/hr (08/05/21 0838)         Active Hospital Problems:  Active Hospital Problems    Diagnosis    • **Altered mental status    • Alzheimer's dementia (CMS/[facility])    • HTN (hypertension)    • Hypothyroidism    • History of CVA (cerebrovascular accident)    • Fall      Plan:     Altered mental status r/o CVA  Fall     -pt found down and confused morning of 8/3/21 and now remains confused and poorly communicative, no obvious lateralizing deficits    -CT head: no acute  findings, limited study due to motion artifact, chronic ischemic changes.     -WBC elevated 13.5 > 10.9    -UA without signs of infection    -CK total 391, low rate IVFs     -  Start daily aspirin and statin    -Neuro discussed case with family, will defer MRI at this time as patient will most likely not tolerate and will not change course as stroke unlikely    -neurology consulted for further recommendations    -DNR/DNI, hospice patient and will consult. Pt will need placement to facility, case management aware.     -SW arranging facility for hospice placement, family meeting planned for today.       #Dementia    -cont home aricept    - suspect worsened by uncontrolled BP and non-compliance with home synthroid     #Hypothyroidism    - TSH 49.79    - FT4 and FT3 are decreased but not severely    - suspect patient has been non compliant with home synthroid    - currently NPO    - start IV levothyroxine 100mcg daily      #H/o CVA    - start daily aspirin and statin     #Hypertension urgency - resolved    - SBP > 180 on admission    - possible element of HTN encephalopathy    -cont home bb    -labetalol PRN with parameters    DVT prophylaxis:  Mechanical DVT prophylaxis orders are present.    CODE STATUS:    Limited Support to NOT Include: Intubation  Code Status: CPR  Medical Interventions (Level of Support Prior to Arrest): Limited      Disposition:  I expect patient to be discharged in 1-2 days     This patient has been examined wearing appropriate Personal Protective Equipment and discussed with PAtient. 08/05/21      Electronically signed by Dariela Paiz DO, 08/05/21, 13:16 EDT.  [facility] Hospitalist Team

## 2021-08-05 NOTE — PLAN OF CARE
Pt has slept pleasantly through the night. Still getting IV fluids per MD orders. Baseline confusion. Falls risk maintained. Will continue to monitor.  Problem: Adult Inpatient Plan of Care  Goal: Plan of Care Review  Outcome: Ongoing, Progressing  Flowsheets (Taken 8/5/2021 0344)  Plan of Care Reviewed With: patient  Goal: Patient-Specific Goal (Individualized)  Outcome: Ongoing, Progressing  Goal: Absence of Hospital-Acquired Illness or Injury  Outcome: Ongoing, Progressing  Intervention: Identify and Manage Fall Risk  Recent Flowsheet Documentation  Taken 8/5/2021 0200 by Conner Lim RN  Safety Promotion/Fall Prevention:   assistive device/personal items within reach   clutter free environment maintained   gait belt   fall prevention program maintained   lighting adjusted   nonskid shoes/slippers when out of bed   room organization consistent   safety round/check completed  Taken 8/5/2021 0010 by Conner Lmi RN  Safety Promotion/Fall Prevention:   assistive device/personal items within reach   clutter free environment maintained   fall prevention program maintained   gait belt   lighting adjusted   nonskid shoes/slippers when out of bed   room organization consistent   safety round/check completed  Taken 8/4/2021 2200 by Conner Lim RN  Safety Promotion/Fall Prevention:   assistive device/personal items within reach   clutter free environment maintained   fall prevention program maintained   gait belt   lighting adjusted   nonskid shoes/slippers when out of bed   room organization consistent   safety round/check completed  Taken 8/4/2021 2000 by Conner Lim RN  Safety Promotion/Fall Prevention:   assistive device/personal items within reach   clutter free environment maintained   fall prevention program maintained   gait belt   lighting adjusted   nonskid shoes/slippers when out of bed   room organization consistent   safety round/check completed  Intervention: Prevent Skin Injury  Recent  Flowsheet Documentation  Taken 8/5/2021 0010 by Conner Lim RN  Skin Protection:   tubing/devices free from skin contact   incontinence pads utilized   adhesive use limited  Taken 8/4/2021 2000 by Conner Lim RN  Skin Protection:   tubing/devices free from skin contact   incontinence pads utilized   adhesive use limited  Intervention: Prevent Infection  Recent Flowsheet Documentation  Taken 8/5/2021 0200 by Conner Lim RN  Infection Prevention:   single patient room provided   rest/sleep promoted   personal protective equipment utilized   hand hygiene promoted  Taken 8/5/2021 0010 by Conner Lim RN  Infection Prevention:   single patient room provided   rest/sleep promoted   personal protective equipment utilized   hand hygiene promoted  Taken 8/4/2021 2200 by Conner Lim RN  Infection Prevention:   single patient room provided   rest/sleep promoted   personal protective equipment utilized   hand hygiene promoted  Taken 8/4/2021 2000 by Conner Lim RN  Infection Prevention:   single patient room provided   personal protective equipment utilized   hand hygiene promoted  Goal: Optimal Comfort and Wellbeing  Outcome: Ongoing, Progressing  Goal: Readiness for Transition of Care  Outcome: Ongoing, Progressing     Problem: Fall Injury Risk  Goal: Absence of Fall and Fall-Related Injury  Outcome: Ongoing, Progressing  Intervention: Promote Injury-Free Environment  Recent Flowsheet Documentation  Taken 8/5/2021 0200 by Conner Lim RN  Safety Promotion/Fall Prevention:   assistive device/personal items within reach   clutter free environment maintained   gait belt   fall prevention program maintained   lighting adjusted   nonskid shoes/slippers when out of bed   room organization consistent   safety round/check completed  Taken 8/5/2021 0010 by Conner Lim RN  Safety Promotion/Fall Prevention:   assistive device/personal items within reach   clutter free environment maintained   fall  prevention program maintained   gait belt   lighting adjusted   nonskid shoes/slippers when out of bed   room organization consistent   safety round/check completed  Taken 8/4/2021 2200 by Conner Lim RN  Safety Promotion/Fall Prevention:   assistive device/personal items within reach   clutter free environment maintained   fall prevention program maintained   gait belt   lighting adjusted   nonskid shoes/slippers when out of bed   room organization consistent   safety round/check completed  Taken 8/4/2021 2000 by Conner Lim RN  Safety Promotion/Fall Prevention:   assistive device/personal items within reach   clutter free environment maintained   fall prevention program maintained   gait belt   lighting adjusted   nonskid shoes/slippers when out of bed   room organization consistent   safety round/check completed     Problem: Skin Injury Risk Increased  Goal: Skin Health and Integrity  Outcome: Ongoing, Progressing  Intervention: Optimize Skin Protection  Recent Flowsheet Documentation  Taken 8/5/2021 0010 by Conner Lim RN  Pressure Reduction Techniques:   frequent weight shift encouraged   weight shift assistance provided  Pressure Reduction Devices: pressure-redistributing mattress utilized  Skin Protection:   tubing/devices free from skin contact   incontinence pads utilized   adhesive use limited  Taken 8/4/2021 2000 by Conner Lim RN  Pressure Reduction Techniques: weight shift assistance provided  Pressure Reduction Devices: pressure-redistributing mattress utilized  Skin Protection:   tubing/devices free from skin contact   incontinence pads utilized   adhesive use limited   Goal Outcome Evaluation:  Plan of Care Reviewed With: patient

## 2021-08-06 VITALS
BODY MASS INDEX: 29.46 KG/M2 | SYSTOLIC BLOOD PRESSURE: 114 MMHG | HEIGHT: 65 IN | RESPIRATION RATE: 21 BRPM | DIASTOLIC BLOOD PRESSURE: 55 MMHG | OXYGEN SATURATION: 97 % | TEMPERATURE: 97.8 F | WEIGHT: 176.81 LBS | HEART RATE: 77 BPM

## 2021-08-06 LAB
BH CV ECHO MEAS - AO MAX PG (FULL): 3.2 MMHG
BH CV ECHO MEAS - AO MAX PG: 4.4 MMHG
BH CV ECHO MEAS - AO MEAN PG (FULL): 1.6 MMHG
BH CV ECHO MEAS - AO MEAN PG: 2.2 MMHG
BH CV ECHO MEAS - AO V2 MAX: 104.5 CM/SEC
BH CV ECHO MEAS - AO V2 MEAN: 69.6 CM/SEC
BH CV ECHO MEAS - AO V2 VTI: 17.3 CM
BH CV ECHO MEAS - ASC AORTA: 3.3 CM
BH CV ECHO MEAS - AVA(I,A): 1.1 CM^2
BH CV ECHO MEAS - AVA(I,D): 1.1 CM^2
BH CV ECHO MEAS - AVA(V,A): 1.1 CM^2
BH CV ECHO MEAS - AVA(V,D): 1.1 CM^2
BH CV ECHO MEAS - BSA(HAYCOCK): 2 M^2
BH CV ECHO MEAS - BSA: 1.9 M^2
BH CV ECHO MEAS - BZI_BMI: 30.1 KILOGRAMS/M^2
BH CV ECHO MEAS - BZI_METRIC_HEIGHT: 165.1 CM
BH CV ECHO MEAS - BZI_METRIC_WEIGHT: 82.1 KG
BH CV ECHO MEAS - EDV(CUBED): 58.7 ML
BH CV ECHO MEAS - EDV(TEICH): 65.4 ML
BH CV ECHO MEAS - EF(CUBED): 68.2 %
BH CV ECHO MEAS - EF(TEICH): 60.4 %
BH CV ECHO MEAS - ESV(CUBED): 18.7 ML
BH CV ECHO MEAS - ESV(TEICH): 25.9 ML
BH CV ECHO MEAS - FS: 31.7 %
BH CV ECHO MEAS - IVS/LVPW: 1.1
BH CV ECHO MEAS - IVSD: 1.1 CM
BH CV ECHO MEAS - LA DIMENSION(2D): 3.3 CM
BH CV ECHO MEAS - LV MASS(C)D: 131.3 GRAMS
BH CV ECHO MEAS - LV MASS(C)DI: 69.2 GRAMS/M^2
BH CV ECHO MEAS - LV MAX PG: 1.2 MMHG
BH CV ECHO MEAS - LV MEAN PG: 0.59 MMHG
BH CV ECHO MEAS - LV V1 MAX: 54.3 CM/SEC
BH CV ECHO MEAS - LV V1 MEAN: 36.2 CM/SEC
BH CV ECHO MEAS - LV V1 VTI: 8.5 CM
BH CV ECHO MEAS - LVIDD: 3.9 CM
BH CV ECHO MEAS - LVIDS: 2.7 CM
BH CV ECHO MEAS - LVOT AREA: 2.1 CM^2
BH CV ECHO MEAS - LVOT DIAM: 1.6 CM
BH CV ECHO MEAS - LVPWD: 1 CM
BH CV ECHO MEAS - MV DEC TIME: 0.1 SEC
BH CV ECHO MEAS - MV E MAX VEL: 96.1 CM/SEC
BH CV ECHO MEAS - MV MAX PG: 3.7 MMHG
BH CV ECHO MEAS - MV MEAN PG: 1.3 MMHG
BH CV ECHO MEAS - MV V2 MAX: 96.8 CM/SEC
BH CV ECHO MEAS - MV V2 MEAN: 53.2 CM/SEC
BH CV ECHO MEAS - MV V2 VTI: 20.1 CM
BH CV ECHO MEAS - MVA(VTI): 0.91 CM^2
BH CV ECHO MEAS - PA MAX PG (FULL): 0.63 MMHG
BH CV ECHO MEAS - PA MAX PG: 2.6 MMHG
BH CV ECHO MEAS - PA MEAN PG (FULL): 0.58 MMHG
BH CV ECHO MEAS - PA MEAN PG: 1.6 MMHG
BH CV ECHO MEAS - PA V2 MAX: 80.9 CM/SEC
BH CV ECHO MEAS - PA V2 MEAN: 60.1 CM/SEC
BH CV ECHO MEAS - PA V2 VTI: 12.5 CM
BH CV ECHO MEAS - RAP SYSTOLE: 3 MMHG
BH CV ECHO MEAS - RV MAX PG: 2 MMHG
BH CV ECHO MEAS - RV MEAN PG: 0.98 MMHG
BH CV ECHO MEAS - RV V1 MAX: 70.5 CM/SEC
BH CV ECHO MEAS - RV V1 MEAN: 46 CM/SEC
BH CV ECHO MEAS - RV V1 VTI: 10.6 CM
BH CV ECHO MEAS - RVDD: 2.2 CM
BH CV ECHO MEAS - SI(CUBED): 21.1 ML/M^2
BH CV ECHO MEAS - SI(LVOT): 9.6 ML/M^2
BH CV ECHO MEAS - SI(TEICH): 20.8 ML/M^2
BH CV ECHO MEAS - SV(CUBED): 40 ML
BH CV ECHO MEAS - SV(LVOT): 18.2 ML
BH CV ECHO MEAS - SV(TEICH): 39.5 ML
MAXIMAL PREDICTED HEART RATE: 129 BPM
STRESS TARGET HR: 110 BPM

## 2021-08-06 PROCEDURE — 99239 HOSP IP/OBS DSCHRG MGMT >30: CPT | Performed by: STUDENT IN AN ORGANIZED HEALTH CARE EDUCATION/TRAINING PROGRAM

## 2021-08-06 PROCEDURE — 92526 ORAL FUNCTION THERAPY: CPT

## 2021-08-06 RX ORDER — ASPIRIN 81 MG/1
81 TABLET ORAL DAILY
Qty: 30 TABLET | Refills: 2 | Status: SHIPPED | OUTPATIENT
Start: 2021-08-06

## 2021-08-06 RX ORDER — ATORVASTATIN CALCIUM 80 MG/1
80 TABLET, FILM COATED ORAL NIGHTLY
Qty: 30 TABLET | Refills: 2 | Status: SHIPPED | OUTPATIENT
Start: 2021-08-06

## 2021-08-06 RX ORDER — ASPIRIN 300 MG/1
300 SUPPOSITORY RECTAL DAILY
Qty: 30 SUPPOSITORY | Refills: 1 | Status: SHIPPED | OUTPATIENT
Start: 2021-08-07 | End: 2021-08-06 | Stop reason: HOSPADM

## 2021-08-06 RX ADMIN — LEVOTHYROXINE SODIUM 100 MCG: 20 INJECTION, SOLUTION INTRAVENOUS at 05:22

## 2021-08-06 RX ADMIN — Medication 3 ML: at 09:15

## 2021-08-06 RX ADMIN — PANTOPRAZOLE SODIUM 40 MG: 40 INJECTION, POWDER, FOR SOLUTION INTRAVENOUS at 05:22

## 2021-08-06 NOTE — DISCHARGE SUMMARY
[facility] Medicine Services  DISCHARGE SUMMARY    Patient Name: Cindy Davis  : 1929  MRN: [id number]    Date of Admission: 8/3/2021  Date of Discharge:  2021  Primary Care Physician: Provider, No Known      Presenting Problem:   Cerebrovascular accident (CVA), unspecified mechanism (CMS/HCC) [I63.9]  Altered mental status [R41.82]    Active and Resolved Hospital Problems:  Active Hospital Problems    Diagnosis POA   • **Altered mental status [R41.82] Yes   • Alzheimer's dementia (CMS/HCC) [G30.9, F02.80] Yes   • HTN (hypertension) [I10] Yes   • Hypothyroidism [E03.9] Yes   • History of CVA (cerebrovascular accident) [Z86.73] Not Applicable   • Fall [W19.XXXA] Yes      Resolved Hospital Problems   No resolved problems to display.         Hospital Course     Hospital Course:  Cindy Davis is a 89 y.o. female who with PMH of CVA, HTN, HLD, previous CVA, and dementia who is at patient of [facility] being treated 3 days a week 2/2 advanced demantia who presented to [facility] ED as she was found down.  OF note, family admits to multiple falls over the past several weeks.  Due to concern for stroke, family was adament she be admitted for cva workup with hospice suspended while admitted.  Neuro spoke with family, no further imaging as stroke unlikely.  Patient minimally responsive but did slightly improve.  Family is unable to take care of patient at home any more, she is to be discharged to [facility].  Family in agreement with plan.         DISCHARGE Follow Up Recommendations for labs and diagnostics:     - Follow up with PCP in 10-14 days   - Take aspirin 81mg daily and lipitor 80mg daily   - Patient to be discharged to [facility]   - Puree diet with thin liquids      Day of Discharge     Vital Signs:  Temp:  [97.4 °F (36.3 °C)-98.6 °F (37 °C)] 97.4 °F (36.3 °C)  Heart Rate:  [34-57] 52  Resp:   [10-22] 10  BP: (119-179)/(54-74) 125/54    Physical Exam:  Physical Exam   Constitutional:       General: She is not in acute distress or pain     Appearance: She is not ill-appearing.      Comments: AAOx1-2, very poor insight  Minimal response to commands  Responds to sternal rub   HENT:      Head: Normocephalic and atraumatic.      Nose: No congestion.      Mouth/Throat:      Mouth: Mucous membranes are dry.      Pharynx: Oropharynx is clear.   Eyes:      General: No scleral icterus.     Extraocular Movements: Extraocular movements intact.   Cardiovascular:      Rate and Rhythm: Normal rate and regular rhythm.      Heart sounds: No murmur heard.   No friction rub. No gallop.    Pulmonary:      Effort: No respiratory distress.      Breath sounds: No wheezing.   Abdominal:      General: There is no distension.      Tenderness: There is no abdominal tenderness. There is no guarding.   Musculoskeletal:         General: No swelling or deformity.      Cervical back: Normal range of motion.      Right lower leg: No edema.      Left lower leg: No edema.   Skin:     Coloration: Skin is not jaundiced.      Findings: No bruising or lesion.   Neurological:      Mental Status: She is disoriented.      Comments: AAOx1-2, poor insight  Minimal responsiveness to commands    Pertinent  and/or Most Recent Results     LAB RESULTS:      Lab 08/05/21  0439 08/04/21  0740 08/03/21  1408   WBC 11.80* 10.90* 13.50*   HEMOGLOBIN 15.0 15.0 15.8   HEMATOCRIT 44.3 45.0 46.7*   PLATELETS 319 348 335   NEUTROS ABS  --   --  11.60*   LYMPHS ABS  --   --  0.90   MONOS ABS  --   --  0.80   EOS ABS  --   --  0.00   MCV 90.5 91.8 89.9   PROTIME  --   --  10.9         Lab 08/05/21  0439 08/04/21  0740 08/03/21  1408   SODIUM 132* 134* 132*   POTASSIUM 4.4 3.9 3.7   CHLORIDE 96* 95* 93*   CO2 26.0 28.0 28.0   ANION GAP 10.0 11.0 11.0   BUN 24* 21 14   CREATININE 1.07* 1.28* 1.08*   GLUCOSE 94 108* 111*   CALCIUM 8.8 9.2 9.3   HEMOGLOBIN A1C  --   --   5.4   TSH  --   --  49.790*         Lab 08/03/21  1408   TOTAL PROTEIN 7.7   ALBUMIN 3.80   GLOBULIN 3.9   ALT (SGPT) 12   AST (SGOT) 24   BILIRUBIN 0.9   ALK PHOS 86         Lab 08/03/21  1408   PROTIME 10.9   INR 0.98         Lab 08/03/21  1408   CHOLESTEROL 346*   LDL CHOL 234*   HDL CHOL 66*   TRIGLYCERIDES 228*         Lab 08/04/21  1259 08/03/21  1408   FOLATE 12.20  --    VITAMIN B 12  --  751   ABO TYPING  --  A   RH TYPING  --  Positive   ANTIBODY SCREEN  --  Negative         Brief Urine Lab Results  (Last result in the past 365 days)      Color   Clarity   Blood   Leuk Est   Nitrite   Protein   CREAT   Urine HCG        08/03/21 1448 Yellow Clear Trace Negative Negative >=300 mg/dL (3+)             Microbiology Results (last 10 days)     Procedure Component Value - Date/Time    COVID PRE-OP / PRE-PROCEDURE SCREENING ORDER (NO ISOLATION) - Swab, Nasopharynx [[id number]]  (Normal) Collected: 08/03/21 1647    Lab Status: Final result Specimen: Swab from Nasopharynx Updated: 08/03/21 1742    Narrative:      The following orders were created for panel order COVID PRE-OP / PRE-PROCEDURE SCREENING ORDER (NO ISOLATION) - Swab, Nasopharynx.  Procedure                               Abnormality         Status                     ---------                               -----------         ------                     COVID-19,CEPHEID,COR/GISELL...[[id number]]  Normal              Final result                 Please view results for these tests on the individual orders.    COVID-19,CEPHEID,COR/GISELL/PAD/RANI IN-HOUSE(OR EMERGENT/ADD-ON),NP SWAB IN TRANSPORT MEDIA 3-4 HR TAT, RT-PCR - Swab, Nasopharynx [[id number]]  (Normal) Collected: 08/03/21 1647    Lab Status: Final result Specimen: Swab from Nasopharynx Updated: 08/03/21 1742     COVID19 Not Detected    Narrative:      Fact sheet for providers: https://www.fda.gov/media/273897/download     Fact sheet for patients: https://www.fda.gov/media/487268/download  Fact sheet for  providers: https://www.fda.gov/media/085988/download     Fact sheet for patients: https://www.fda.gov/media/485355/download          XR Chest 1 View    Result Date: 8/3/2021  Impression: No acute cardiopulmonary process identified.  Electronically Signed By-Lincoln Keller MD On:8/3/2021 1:50 PM This report was finalized on [id number] by  Lincoln Keller MD.    CT Head Without Contrast Stroke Protocol    Result Date: 8/3/2021  Impression: IMPRESSION :  1. This study is limited by motion artifact. 2. No acute findings. 3. Chronic ischemic changes. 4. Volume loss secondary to age-appropriate atrophy.  Electronically Signed By-Calvin Oro MD On:8/3/2021 2:39 PM This report was finalized on [id number] by  Calvin Oro MD.    XR Hips Bilateral With or Without Pelvis 5 View    Result Date: 8/3/2021  Impression: IMPRESSION : No acute fracture or traumatic malalignment identified.  Electronically Signed By-Lincoln Keller MD On:8/3/2021 1:51 PM This report was finalized on [id number] by  Lincoln Keller MD.              Results for orders placed during the hospital encounter of 08/03/21    Adult Transthoracic Echo Complete W/ Cont if Necessary Per Protocol (With Agitated Saline)    Interpretation Summary  Technically difficult study due to poor acoustic windows.  Lumason contrast was given to better evaluate LV function.  Normal LV size and contractility EF of 60 to 65%  Normal RV size  Atria appear normal in size, not well visualized.  Aortic valve, mitral valve, tricuspid valve are not well-visualized, no significant Doppler abnormalities seen.  No pericardial effusion seen.  Proximal aorta appears normal in size.      Labs Pending at Discharge:      Procedures Performed           Consults:   Consults     Date and Time Order Name Status Description    8/3/2021  6:38 PM Inpatient Neurology Consult Stroke Completed     8/3/2021  3:21 PM Hospitalist (on-call MD unless specified) Completed             Discharge  Details        Discharge Medications      New Medications      Instructions Start Date   aspirin 81 MG EC tablet   81 mg, Oral, Daily      atorvastatin 80 MG tablet  Commonly known as: LIPITOR   80 mg, Oral, Nightly         Continue These Medications      Instructions Start Date   acetaminophen 500 MG tablet  Commonly known as: TYLENOL   1,000 mg, Oral, Every 6 Hours PRN      donepezil 10 MG tablet  Commonly known as: ARICEPT   10 mg, Oral, 2 times daily      levothyroxine 125 MCG tablet  Commonly known as: SYNTHROID, LEVOTHROID   125 mcg, Oral, Daily      Metoprolol-HCTZ ER 25-12.5 MG tablet sustained-release 24 hour   1 tablet, Oral, 2 times daily      omeprazole 40 MG capsule  Commonly known as: priLOSEC   40 mg, Oral, Daily             No Known Allergies      Discharge Disposition:  Hospice/Medical Facility (DC - External)    Diet:  Hospital:  Diet Order   Procedures   • Diet Texture; Pureed Diet         Discharge Activity:   Activity Instructions     Activity as Tolerated              CODE STATUS:  Code Status and Medical Interventions:   Ordered at: 08/05/21 1600     Limited Support to NOT Include:    Artificial Nutrition    Cardioversion/Defibrillation    Intubation    Vasopressors     Code Status:    No CPR     Medical Interventions (Level of Support Prior to Arrest):    Limited         No future appointments.    Additional Instructions for the Follow-ups that You Need to Schedule     Call MD With Problems / Concerns   As directed      Instructions: Call [phone] or email [email or web address] for problems or concerns.    Order Comments: Instructions: Call [phone] or email [email or web address] for problems or concerns.          Discharge Follow-up with PCP   As directed       Currently Documented PCP:    Provider, No Known    PCP Phone Number:    None     Follow Up Details: Follow up with PCP in 10-14 days               Time spent on Discharge including face to face service:  34  minutes    This patient has been examined wearing appropriate Personal Protective Equipment and discussed with Nursing. 08/06/21      Signature: Electronically signed by Dariela Paiz DO, 08/06/21, 12:00 PM EDT.

## 2021-08-06 NOTE — THERAPY RE-EVALUATION
Acute Care - Speech Language Pathology Re-Evaluation     [place]     Patient Name: Cindy Davis  : 1929  MRN: [id number]    Today's Date: 2021                   Admit Date: 8/3/2021       Visit Dx:      ICD-10-CM ICD-9-CM   1. Cerebrovascular accident (CVA), unspecified mechanism (CMS/HCC)  I63.9 434.91       Patient Active Problem List   Diagnosis   • Altered mental status   • Alzheimer's dementia (CMS/HCC)   • HTN (hypertension)   • Hypothyroidism   • History of CVA (cerebrovascular accident)   • Fall       Past Medical History:   Diagnosis Date   • Alzheimer's dementia (CMS/HCC)    • Breast CA (CMS/HCC)    • HTN (hypertension)    • Hyperlipidemia    • Hypothyroidism         Skilled ST intervention conducted this date targeting dysphagia.  Pt alert, pleasant and amenable to treatment.  The patient denies pain. Pt on room air during session. Pt currently prescribed a Other/NPO. Patient was not wearing a face mask during this therapy encounter. Therapist used appropriate personal protective equipment including mask, eye protection and gloves.  Mask used was standard procedure mask. Appropriate PPE was worn during the entire therapy session. Hand hygiene was completed before and after therapy session. Patient is not in enhanced droplet precautions.     Treatment narrative/results:    The patient was seen bedside for re-evaluation of swallow. She was seated up in bed with head of bed elevated to approximately 90 degrees. PT much more awake, alert and responsive today. Pt very pleasant and amenable to po trials. Oral cavity noted to be very dry/sticky. PT given the following: ice chip trials x 2; sips of water by spoon x 2; larger sips of water by straw x 3-4; and pureed (applesauce) trials x 3. PT able to achieve adequate labial seal on spoon/straw. PT able to clear utensil and pull thin water via straw adequately. No anterior labial spillage noted at any time. Pt clears oral cavity well. Pt appears to  be edentulous. Oral motor assessment attempted however unable to complete this due to decreased hearing acuity and ability to follow commands. No overt s/s of difficulty noted with any trial/consistency given. Soft/regular solids not attempted due to suspected edentulous state.    RECOMMEND: ST recommends that the patient initiate a puree diet with thin liquids at this time. She should be up at a full 90 degree angle for all po, remain up for at least 30 minutes following, and be a full feed. ST will continue to follow as needed during her hospitalization to insure safest/least restrictive diet, patient/caregiver teaching and additional assessment for possible diet upgrade as able. Discussed with nursing staff who are in agreement with plan/recommendations.       History reviewed. No pertinent surgical history.    SLP Recommendation and Plan        EDUCATION    The patient has been educated in the following areas:       Dysphagia (Swallowing Impairment) Modified Diet Instruction.            SLP GOALS     Row Name 08/06/21 1100       Oral Nutrition/Hydration Goal 1 (SLP)    Oral Nutrition/Hydration Goal 1, SLP  The patient will participate in ongoing assessment of swallow (including re-evaluation clinically and/or including a possible instrumental assessment of swallow) to further objectively assess swallow function in preparation for po.   -SM    Time Frame (Oral Nutrition/Hydration Goal 1, SLP)  short term goal (STG)  -SM    Barriers (Oral Nutrition/Hydration Goal 1, SLP)  Re-evaluation of swallow completed today. See above note  -SM    Progress/Outcomes (Oral Nutrition/Hydration Goal 1, SLP)  goal ongoing;good progress toward goal  -SM       Oral Nutrition/Hydration Goal 2 (SLP)    Oral Nutrition/Hydration Goal 2, SLP  The patient will establish a po diet.   -SM    Time Frame (Oral Nutrition/Hydration Goal 2, SLP)  short term goal (STG)  -SM    Barriers (Oral Nutrition/Hydration Goal 2, SLP)  ST recommends that  the patient initiate a puree diet with thin liquids at this time  -SM    Progress/Outcomes (Oral Nutrition/Hydration Goal 2, SLP)  goal ongoing;good progress toward goal  -SM       Oral Nutrition/Hydration Goal (SLP)    Oral Nutrition/Hydration Goal, SLP  Pt will tolerate safest and least restrictive diet/ liquids without complications of aspiration.   -SM    Time Frame (Oral Nutrition/Hydration Goal, SLP)  by discharge  -SM    Barriers (Oral Nutrition/Hydration Goal, SLP)  ST recommends follow up meal assessement with further goals/rec to follow  -SM    Progress/Outcomes (Oral Nutrition/Hydration Goal, SLP)  goal ongoing  -SM      User Key  (r) = Recorded By, (t) = Taken By, (c) = Cosigned By    Initials Name Provider Type    Azalia Jesus, SLP Speech and Language Pathologist    Joanna Card, Speech Therapy Student Speech Therapy Student                      Time Calculation:                                  ANTWON Mcduffie  8/6/2021

## 2021-08-06 NOTE — PLAN OF CARE
Problem: Adult Inpatient Plan of Care  Goal: Plan of Care Review  Outcome: Ongoing, Progressing  Goal: Patient-Specific Goal (Individualized)  Outcome: Ongoing, Progressing  Goal: Absence of Hospital-Acquired Illness or Injury  Outcome: Ongoing, Progressing  Intervention: Identify and Manage Fall Risk  Recent Flowsheet Documentation  Taken 8/6/2021 0730 by Neva Trevino, RN  Safety Promotion/Fall Prevention: safety round/check completed  Taken 8/5/2021 1800 by Neva Trevino RN  Safety Promotion/Fall Prevention: safety round/check completed  Goal: Optimal Comfort and Wellbeing  Outcome: Ongoing, Progressing  Goal: Readiness for Transition of Care  Outcome: Ongoing, Progressing   Goal Outcome Evaluation: Patient resting quietly.

## 2021-08-06 NOTE — PLAN OF CARE
Goal Outcome Evaluation:  Plan of Care Reviewed With: patient  Pt under hospice care now and no need for acute OT. Will complete orders.

## 2021-08-06 NOTE — PLAN OF CARE
Provided comfort for pt throughout the night. Pt is very pleasant, but confused. No complaints throughout the night. HR has stayed in the 40s the majority of the night. Planning to transfer to rehab with hospice today, see social work's note. Fall prevention maintained. Will continue to monitor.  Problem: Adult Inpatient Plan of Care  Goal: Plan of Care Review  Outcome: Ongoing, Progressing  Flowsheets (Taken 8/6/2021 0505)  Plan of Care Reviewed With: patient  Goal: Patient-Specific Goal (Individualized)  Outcome: Ongoing, Progressing  Goal: Absence of Hospital-Acquired Illness or Injury  Outcome: Ongoing, Progressing  Intervention: Identify and Manage Fall Risk  Recent Flowsheet Documentation  Taken 8/6/2021 0400 by Conner Lim RN  Safety Promotion/Fall Prevention:   assistive device/personal items within reach   clutter free environment maintained   fall prevention program maintained   gait belt   lighting adjusted   nonskid shoes/slippers when out of bed   room organization consistent   safety round/check completed  Taken 8/6/2021 0200 by Conner Lim RN  Safety Promotion/Fall Prevention:   assistive device/personal items within reach   clutter free environment maintained   fall prevention program maintained   gait belt   lighting adjusted   nonskid shoes/slippers when out of bed   room organization consistent   safety round/check completed  Taken 8/6/2021 0000 by Conner Lim RN  Safety Promotion/Fall Prevention:   assistive device/personal items within reach   clutter free environment maintained   fall prevention program maintained   gait belt   lighting adjusted   nonskid shoes/slippers when out of bed   room organization consistent   safety round/check completed  Taken 8/5/2021 2200 by Conner Lim RN  Safety Promotion/Fall Prevention:   assistive device/personal items within reach   clutter free environment maintained   fall prevention program maintained   gait belt   lighting adjusted    nonskid shoes/slippers when out of bed   room organization consistent   safety round/check completed  Taken 8/5/2021 2000 by Conner Lim RN  Safety Promotion/Fall Prevention:   assistive device/personal items within reach   clutter free environment maintained   fall prevention program maintained   gait belt   lighting adjusted   nonskid shoes/slippers when out of bed   room organization consistent   safety round/check completed  Intervention: Prevent Skin Injury  Recent Flowsheet Documentation  Taken 8/5/2021 2000 by Conner Lim RN  Skin Protection:   adhesive use limited   incontinence pads utilized   tubing/devices free from skin contact  Intervention: Prevent Infection  Recent Flowsheet Documentation  Taken 8/6/2021 0400 by Conner Lim RN  Infection Prevention:   single patient room provided   rest/sleep promoted   hand hygiene promoted   personal protective equipment utilized  Taken 8/6/2021 0200 by Conner Lim RN  Infection Prevention:   single patient room provided   rest/sleep promoted   personal protective equipment utilized   hand hygiene promoted  Taken 8/6/2021 0000 by Conner Lim RN  Infection Prevention:   single patient room provided   rest/sleep promoted   personal protective equipment utilized   hand hygiene promoted  Taken 8/5/2021 2200 by Conner Lim RN  Infection Prevention:   rest/sleep promoted   single patient room provided   personal protective equipment utilized   hand hygiene promoted  Taken 8/5/2021 2000 by Conner Lim RN  Infection Prevention:   single patient room provided   rest/sleep promoted   personal protective equipment utilized   hand hygiene promoted  Goal: Optimal Comfort and Wellbeing  Outcome: Ongoing, Progressing  Intervention: Provide Person-Centered Care  Recent Flowsheet Documentation  Taken 8/5/2021 2000 by Conner Lim RN  Trust Relationship/Rapport: care explained  Goal: Readiness for Transition of Care  Outcome: Ongoing,  Progressing     Problem: Fall Injury Risk  Goal: Absence of Fall and Fall-Related Injury  Outcome: Ongoing, Progressing  Intervention: Promote Injury-Free Environment  Recent Flowsheet Documentation  Taken 8/6/2021 0400 by Conner Lim RN  Safety Promotion/Fall Prevention:   assistive device/personal items within reach   clutter free environment maintained   fall prevention program maintained   gait belt   lighting adjusted   nonskid shoes/slippers when out of bed   room organization consistent   safety round/check completed  Taken 8/6/2021 0200 by Conner Lim RN  Safety Promotion/Fall Prevention:   assistive device/personal items within reach   clutter free environment maintained   fall prevention program maintained   gait belt   lighting adjusted   nonskid shoes/slippers when out of bed   room organization consistent   safety round/check completed  Taken 8/6/2021 0000 by Conner Lim RN  Safety Promotion/Fall Prevention:   assistive device/personal items within reach   clutter free environment maintained   fall prevention program maintained   gait belt   lighting adjusted   nonskid shoes/slippers when out of bed   room organization consistent   safety round/check completed  Taken 8/5/2021 2200 by Conner Lim RN  Safety Promotion/Fall Prevention:   assistive device/personal items within reach   clutter free environment maintained   fall prevention program maintained   gait belt   lighting adjusted   nonskid shoes/slippers when out of bed   room organization consistent   safety round/check completed  Taken 8/5/2021 2000 by Conner Lim RN  Safety Promotion/Fall Prevention:   assistive device/personal items within reach   clutter free environment maintained   fall prevention program maintained   gait belt   lighting adjusted   nonskid shoes/slippers when out of bed   room organization consistent   safety round/check completed     Problem: Skin Injury Risk Increased  Goal: Skin Health and  Integrity  Outcome: Ongoing, Progressing  Intervention: Optimize Skin Protection  Recent Flowsheet Documentation  Taken 8/5/2021 2000 by Conner Lim, RN  Pressure Reduction Techniques:   frequent weight shift encouraged   weight shift assistance provided  Pressure Reduction Devices: pressure-redistributing mattress utilized  Skin Protection:   adhesive use limited   incontinence pads utilized   tubing/devices free from skin contact   Goal Outcome Evaluation:  Plan of Care Reviewed With: patient

## 2021-08-06 NOTE — PLAN OF CARE
Goal Outcome Evaluation:   The patient was seen bedside for re-evaluation of swallow. She was seated up in bed with head of bed elevated to approximately 90 degrees. PT much more awake, alert and responsive today. Pt very pleasant and amenable to po trials. Oral cavity noted to be very dry/sticky. PT given the following: ice chip trials x 2; sips of water by spoon x 2; larger sips of water by straw x 3-4; and pureed (applesauce) trials x 3. PT able to achieve adequate labial seal on spoon/straw. PT able to clear utensil and pull thin water via straw adequately. No anterior labial spillage noted at any time. Pt clears oral cavity well. Pt appears to be edentulous. Oral motor assessment attempted however unable to complete this due to decreased hearing acuity and ability to follow commands. No overt s/s of difficulty noted with any trial/consistency given. Soft/regular solids not attempted due to suspected edentulous state.    RECOMMEND: ST recommends that the patient initiate a puree diet with thin liquids at this time. She should be up at a full 90 degree angle for all po, remain up for at least 30 minutes following, and be a full feed. ST will continue to follow as needed during her hospitalization to insure safest/least restrictive diet, patient/caregiver teaching and additional assessment for possible diet upgrade as able. Discussed with nursing staff who are in agreement with plan/recommendations.

## 2021-08-06 NOTE — PLAN OF CARE
Goal Outcome Evaluation:  Patient going to [facility] with Hospice.

## 2021-08-06 NOTE — NURSING NOTE
Report called to Tiffany ALVAREZ at [facility].  Transport scheduled to pick pt up at 1700.